# Patient Record
Sex: FEMALE | Race: BLACK OR AFRICAN AMERICAN | NOT HISPANIC OR LATINO | Employment: UNEMPLOYED | URBAN - METROPOLITAN AREA
[De-identification: names, ages, dates, MRNs, and addresses within clinical notes are randomized per-mention and may not be internally consistent; named-entity substitution may affect disease eponyms.]

---

## 2019-02-14 ENCOUNTER — HOSPITAL ENCOUNTER (EMERGENCY)
Facility: MEDICAL CENTER | Age: 49
End: 2019-02-14
Attending: EMERGENCY MEDICINE

## 2019-02-14 VITALS
BODY MASS INDEX: 28.14 KG/M2 | TEMPERATURE: 98.3 F | WEIGHT: 190 LBS | HEIGHT: 69 IN | RESPIRATION RATE: 16 BRPM | HEART RATE: 90 BPM | SYSTOLIC BLOOD PRESSURE: 153 MMHG | DIASTOLIC BLOOD PRESSURE: 99 MMHG

## 2019-02-14 DIAGNOSIS — Z91.148 H/O MEDICATION NONCOMPLIANCE: ICD-10-CM

## 2019-02-14 DIAGNOSIS — I10 ESSENTIAL HYPERTENSION: ICD-10-CM

## 2019-02-14 PROCEDURE — 99284 EMERGENCY DEPT VISIT MOD MDM: CPT

## 2019-02-15 NOTE — ED NOTES
Pt discharged to [facility], pt left in [employer] custody. Pt is ambulatory with a steady gait. All paperwork provided and given to [employer]. Pt is A&Ox4, no questions regarding discharge. Pt given the name of her BP medication that she was on prior. Pt left with all belongings.

## 2019-02-15 NOTE — ED TRIAGE NOTES
Whitneygaldino Latif  48 y.o. female  Chief Complaint   Patient presents with   • Medical Clearance   • Hypertension       Pt BIB [employer] for clearance to go to halfway, pt presents with hypertension. Pt has a hx of hypertension and reports that she has been off all of her medications due to insurance. Pt is A&Ox4, BP at the halfway was 186/122. Pt is now 166/117 upon arrival. Waiting ERP

## 2019-02-15 NOTE — ED PROVIDER NOTES
ED Provider Note    CHIEF COMPLAINT  Chief Complaint   Patient presents with   • Medical Clearance   • Hypertension       HPI  Rae Latif is a 48 y.o. female who presents to [facility] department for medical screening.  Past history of hypertension.  She has been off of her antihypertensive medication for approximately 4 months.  Tonight was picked up by [employer] for outstanding warrant.  At the booking triage she had elevated blood pressure and was therefore sent to the ER for medical screening.  The patient states that she is had no medical complaints and has been feeling herself.  No headache.  No chest pain.  No shortness of breath.  No abdominal pain.  She denies any stimulant use including caffeine, cocaine or methamphetamines.  She says that she has previously been seen by [facility] as well as a local clinic for both the management of her hypertension and her COPD.  Again no current complaints.    REVIEW OF SYSTEMS  See HPI for further details. All other systems are negative.     PAST MEDICAL HISTORY   has a past medical history of Arthritis shoulder (11/15/2011); ASTHMA; Bipolar disorder (HCC) (9/28/2011); COPD, mild (HCC) (1/26/2012); Head ache (9/28/2011); Hepatitis C (2005); Hypertension; Psychiatric disorder; and Rotator cuff tendinitis (9/28/2011).    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    CURRENT MEDICATIONS  Home Medications     Reviewed by Titi  "JANICE Thomson R.N. (Registered Nurse) on 02/14/19 at 1921  Med List Status: Partial   Medication Last Dose Status   acetaminophen 650 MG Tab  Active   amlodipine (NORVASC) 10 MG Tab  Active   ciprofloxacin (CIPRO) 500 MG Tab  Active   docusate sodium (COLACE) 100 MG Cap  Active   doxycycline (MONODOX) 100 MG capsule  Active   famotidine (PEPCID) 20 MG Tab  Active   ferrous gluconate (FERGON) 324 (38 FE) MG Tab  Active   hydrocodone-acetaminophen (NORCO) 5-325 MG Tab per tablet  Active   lorazepam (ATIVAN) 2 MG/ML Conc  Active   morphine SR (MS CONTIN) 15 MG Tab CR tablet  Active   oxycodone immediate release (ROXICODONE) 10 MG immediate release tablet  Active   senna-docusate (PERICOLACE OR SENOKOT S) 8.6-50 MG Tab  Active   sulfamethoxazole-trimethoprim (BACTRIM) 400-80 MG Tab  Active                ALLERGIES  Allergies   Allergen Reactions   • Lisinopril      DIZZINESS       PHYSICAL EXAM  VITAL SIGNS: /99   Pulse 90   Temp 36.8 °C (98.3 °F) (Temporal)   Resp 16   Ht 1.753 m (5' 9\")   Wt 86.2 kg (190 lb)   BMI 28.06 kg/m²  @BELIA[[id number]::@  Pulse ox interpretation: I interpret this pulse ox as normal.  Constitutional: Alert in no apparent distress.  HENT: Normocephalic, Atraumatic, Bilateral external ears normal. Nose normal.   Eyes: Pupils are equal and reactive. Conjunctiva normal, non-icteric.   Heart: Regular rate and rythm, no murmurs.    Lungs: Clear to auscultation bilaterally.  Abdomen: Soft, nontender, nondistended  Skin: Warm, Dry, No erythema, No rash.   Neurologic: Alert, Grossly non-focal.   Psychiatric: Affect normal, Judgment normal, Mood normal, Appears appropriate and not intoxicated.           COURSE & MEDICAL DECISION MAKING  Pertinent Labs & Imaging studies reviewed. (See chart for details)  Patient presented the emerge department for medical screening for PD.  Patient picked up for warrant and noted to be hypertensive.  Patient with known history of hypertension and medication " noncompliance.  I have given her referral back to primary care and local clinic for ongoing outpatient blood pressure monitoring and treatment.  At this point I do not feel there is any further medical need for evaluation or intervention she can be processed at the shelter.  Again she is understanding of need for ongoing care of her blood pressure.       The patient will return for worsening symptoms and is stable at the time of discharge. The patient verbalizes understanding and will comply.    FINAL IMPRESSION  1. Essential hypertension    2. H/O medication noncompliance               Electronically signed by: Myron Chow, 2/14/2019 8:15 PM

## 2019-03-27 ENCOUNTER — HOSPITAL ENCOUNTER (EMERGENCY)
Facility: MEDICAL CENTER | Age: 49
End: 2019-03-27
Attending: EMERGENCY MEDICINE

## 2019-03-27 ENCOUNTER — APPOINTMENT (OUTPATIENT)
Dept: RADIOLOGY | Facility: MEDICAL CENTER | Age: 49
End: 2019-03-27
Attending: EMERGENCY MEDICINE

## 2019-03-27 VITALS
HEIGHT: 69 IN | OXYGEN SATURATION: 98 % | WEIGHT: 190 LBS | SYSTOLIC BLOOD PRESSURE: 122 MMHG | DIASTOLIC BLOOD PRESSURE: 76 MMHG | HEART RATE: 78 BPM | BODY MASS INDEX: 28.14 KG/M2 | TEMPERATURE: 98.6 F | RESPIRATION RATE: 18 BRPM

## 2019-03-27 DIAGNOSIS — M17.10 ARTHRITIS OF KNEE: ICD-10-CM

## 2019-03-27 DIAGNOSIS — M25.561 ACUTE PAIN OF RIGHT KNEE: ICD-10-CM

## 2019-03-27 PROCEDURE — 99284 EMERGENCY DEPT VISIT MOD MDM: CPT

## 2019-03-27 PROCEDURE — A9270 NON-COVERED ITEM OR SERVICE: HCPCS | Performed by: EMERGENCY MEDICINE

## 2019-03-27 PROCEDURE — 700102 HCHG RX REV CODE 250 W/ 637 OVERRIDE(OP): Performed by: EMERGENCY MEDICINE

## 2019-03-27 PROCEDURE — 73564 X-RAY EXAM KNEE 4 OR MORE: CPT | Mod: RT

## 2019-03-27 RX ORDER — NAPROXEN 500 MG/1
500 TABLET ORAL ONCE
Status: COMPLETED | OUTPATIENT
Start: 2019-03-27 | End: 2019-03-27

## 2019-03-27 RX ADMIN — Medication 500 MG: at 18:11

## 2019-03-28 NOTE — ED TRIAGE NOTES
"Chief Complaint   Patient presents with   • Knee Pain     Blood pressure 123/78, pulse 80, temperature 36.9 °C (98.4 °F), temperature source Temporal, resp. rate 17, height 1.753 m (5' 9\"), weight 86.2 kg (190 lb), SpO2 97 %, not currently breastfeeding.    Pt is a 49yo female presenting to ED via EMS w c/o R knee pain. PT reports she fell on her R knee and has had pain since. Pt reports hx of arthroscopy. Pt able to bear weight. + distal pulses and sensation.   "

## 2019-03-28 NOTE — DISCHARGE INSTRUCTIONS
Your x-rays did not show any injury from your recent fall.  You do have severe arthritis in your right knee.  Although you may benefit from consulting orthopedics at your earliest convenience for reevaluation and since, you are medically cleared to return to law enforcement custody.  Pain from arthritis is best treated with medications like Tylenol and ibuprofen, ice packs, and use of ACE banages or neoprene knee braces for extra support.

## 2019-03-28 NOTE — ED PROVIDER NOTES
ED Provider Note    Scribed for Bijan Reveles M.D. by Bijan Reveles. 3/27/2019  5:32 PM    CHIEF COMPLAINT  Chief Complaint   Patient presents with   • Knee Pain       HPI  Kassie Delgadillo is a 48 y.o. female who presents to the Emergency Room From [facility] after falling onto her right knee.  She says that she has a history of arthroscopy to this knee, and has had problems with it chronically, and it sometimes gives out on her.  She says that she was trying to step up, and the knee felt unstable, and then she fell, landing directly on her anterior right knee.  However, she is been ambulatory since that incident a couple of hours ago.  She did not have any prodrome of weakness or dizziness.  She has not been feeling sick and denies fevers or chills or nausea or vomiting or chest pain or shortness of breath.    REVIEW OF SYSTEMS  See HPI for further details.    PAST MEDICAL HISTORY   has a past medical history of Arthritis shoulder (11/15/2011); ASTHMA; Bipolar disorder ([facility]) (9/28/2011); COPD, mild ([facility]) (1/26/2012); Head ache (9/28/2011); Hepatitis C (2005); Hypertension; Psychiatric disorder; and Rotator cuff tendinitis (9/28/2011).    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    CURRENT MEDICATIONS  Home Medications     Reviewed by Arnold Elder R.N. (Registered Nurse) on 03/27/19 at 1627  Med List Status: Partial   Medication Last Dose Status  "  acetaminophen 650 MG Tab  Active   amlodipine (NORVASC) 10 MG Tab  Active   ciprofloxacin (CIPRO) 500 MG Tab  Active   docusate sodium (COLACE) 100 MG Cap  Active   doxycycline (MONODOX) 100 MG capsule  Active   famotidine (PEPCID) 20 MG Tab  Active   ferrous gluconate (FERGON) 324 (38 FE) MG Tab  Active   hydrocodone-acetaminophen (NORCO) 5-325 MG Tab per tablet  Active   lorazepam (ATIVAN) 2 MG/ML Conc  Active   morphine SR (MS CONTIN) 15 MG Tab CR tablet  Active   oxycodone immediate release (ROXICODONE) 10 MG immediate release tablet  Active   senna-docusate (PERICOLACE OR SENOKOT S) 8.6-50 MG Tab  Active   sulfamethoxazole-trimethoprim (BACTRIM) 400-80 MG Tab  Active                ALLERGIES  Allergies   Allergen Reactions   • Lisinopril      DIZZINESS       PHYSICAL EXAM  VITAL SIGNS: /76   Pulse 78   Temp 37 °C (98.6 °F)   Resp 18   Ht 1.753 m (5' 9\")   Wt 86.2 kg (190 lb)   SpO2 98%   BMI 28.06 kg/m²   Pulse ox interpretation: I interpret this pulse ox as normal.  Constitutional: Alert in no apparent distress.  HENT: Normocephalic, Atraumatic, Bilateral external ears normal. Nose normal.   Eyes: Conjunctiva normal, non-icteric.   Heart: Normal peripheral perfusion.  Lungs: Unlabored respirations  Skin: Warm, Dry, No erythema, No rash.   Extremities: Diffuse tenderness anteriorly to the right knee without ligamentous laxity.  There is a significant joint effusion.  There is no obvious bony deformity or crepitus.  Neurologic: Alert, Grossly non-focal.   Psychiatric: Affect normal, Judgment normal, Mood normal, Appears appropriate and not intoxicated.     DX-KNEE COMPLETE 4+ RIGHT   Final Result      1.  No acute osseous abnormality.   2.  Severe tricompartmental osteoarthrosis.          COURSE & MEDICAL DECISION MAKING  The patient's VS, Nurses notes reviewed. (See chart for details)    5:32 PM Patient seen and examined at bedside. Ordered for x-ray of the right knee to evaluate. Patient will be " treated with Naprosyn and an ice pack for her symptoms.     I returned to the bedside with the patient and the accompanying officer know that her x-ray did not show any signs of acute injury, though she has severe tricompartmental arthritis.  She is stable for discharge back to law enforcement custody, but may benefit from consultation with orthopedics at her earliest convenience.  We reviewed supportive care at the bedside as well.     The patient will return for new or worsening symptoms and is stable at the time of discharge.    The patient is referred to a primary physician for blood pressure management, diabetic screening, and for all other preventative health concerns.      DISPOSITION:  Patient will be discharged home in stable condition.    FOLLOW UP:  Lamin Barkley M.D.  [address]  [address]  [facility] [id number] [phone]    Schedule an appointment as soon as possible for a visit         OUTPATIENT MEDICATIONS:  Discharge Medication List as of 3/27/2019  6:54 PM            FINAL IMPRESSION  1. Acute pain of right knee Active   2. Arthritis of knee Chronic

## 2020-09-11 ENCOUNTER — HOSPITAL ENCOUNTER (INPATIENT)
Facility: MEDICAL CENTER | Age: 50
LOS: 3 days | DRG: 603 | End: 2020-09-15
Attending: EMERGENCY MEDICINE | Admitting: INTERNAL MEDICINE
Payer: MEDICAID

## 2020-09-11 ENCOUNTER — APPOINTMENT (OUTPATIENT)
Dept: RADIOLOGY | Facility: MEDICAL CENTER | Age: 50
DRG: 603 | End: 2020-09-11
Attending: EMERGENCY MEDICINE
Payer: MEDICAID

## 2020-09-11 DIAGNOSIS — L03.116 LEFT LEG CELLULITIS: ICD-10-CM

## 2020-09-11 DIAGNOSIS — S80.212A ABRASION, LEFT KNEE, INITIAL ENCOUNTER: ICD-10-CM

## 2020-09-11 LAB
ANION GAP SERPL CALC-SCNC: 11 MMOL/L (ref 7–16)
BASOPHILS # BLD AUTO: 0.4 % (ref 0–1.8)
BASOPHILS # BLD: 0.07 K/UL (ref 0–0.12)
BUN SERPL-MCNC: 14 MG/DL (ref 8–22)
CALCIUM SERPL-MCNC: 8.2 MG/DL (ref 8.5–10.5)
CHLORIDE SERPL-SCNC: 104 MMOL/L (ref 96–112)
CO2 SERPL-SCNC: 18 MMOL/L (ref 20–33)
CREAT SERPL-MCNC: 0.85 MG/DL (ref 0.5–1.4)
EOSINOPHIL # BLD AUTO: 0.03 K/UL (ref 0–0.51)
EOSINOPHIL NFR BLD: 0.2 % (ref 0–6.9)
ERYTHROCYTE [DISTWIDTH] IN BLOOD BY AUTOMATED COUNT: 47.4 FL (ref 35.9–50)
GLUCOSE SERPL-MCNC: 101 MG/DL (ref 65–99)
HCT VFR BLD AUTO: 39.8 % (ref 37–47)
HGB BLD-MCNC: 13.6 G/DL (ref 12–16)
IMM GRANULOCYTES # BLD AUTO: 0.16 K/UL (ref 0–0.11)
IMM GRANULOCYTES NFR BLD AUTO: 0.8 % (ref 0–0.9)
LACTATE BLD-SCNC: 1.4 MMOL/L (ref 0.5–2)
LYMPHOCYTES # BLD AUTO: 1.24 K/UL (ref 1–4.8)
LYMPHOCYTES NFR BLD: 6.2 % (ref 22–41)
MCH RBC QN AUTO: 33.7 PG (ref 27–33)
MCHC RBC AUTO-ENTMCNC: 34.2 G/DL (ref 33.6–35)
MCV RBC AUTO: 98.8 FL (ref 81.4–97.8)
MONOCYTES # BLD AUTO: 1.43 K/UL (ref 0–0.85)
MONOCYTES NFR BLD AUTO: 7.2 % (ref 0–13.4)
NEUTROPHILS # BLD AUTO: 17.07 K/UL (ref 2–7.15)
NEUTROPHILS NFR BLD: 85.2 % (ref 44–72)
NRBC # BLD AUTO: 0 K/UL
NRBC BLD-RTO: 0 /100 WBC
PLATELET # BLD AUTO: 171 K/UL (ref 164–446)
PMV BLD AUTO: 9.2 FL (ref 9–12.9)
POTASSIUM SERPL-SCNC: 4 MMOL/L (ref 3.6–5.5)
RBC # BLD AUTO: 4.03 M/UL (ref 4.2–5.4)
SODIUM SERPL-SCNC: 133 MMOL/L (ref 135–145)
WBC # BLD AUTO: 20 K/UL (ref 4.8–10.8)

## 2020-09-11 PROCEDURE — 96375 TX/PRO/DX INJ NEW DRUG ADDON: CPT

## 2020-09-11 PROCEDURE — 73562 X-RAY EXAM OF KNEE 3: CPT | Mod: LT

## 2020-09-11 PROCEDURE — 87040 BLOOD CULTURE FOR BACTERIA: CPT

## 2020-09-11 PROCEDURE — 99219 PR INITIAL OBSERVATION CARE,LEVL II: CPT | Performed by: INTERNAL MEDICINE

## 2020-09-11 PROCEDURE — 99285 EMERGENCY DEPT VISIT HI MDM: CPT

## 2020-09-11 PROCEDURE — C9803 HOPD COVID-19 SPEC COLLECT: HCPCS | Performed by: INTERNAL MEDICINE

## 2020-09-11 PROCEDURE — 93971 EXTREMITY STUDY: CPT | Mod: LT

## 2020-09-11 PROCEDURE — G0378 HOSPITAL OBSERVATION PER HR: HCPCS

## 2020-09-11 PROCEDURE — 85025 COMPLETE CBC W/AUTO DIFF WBC: CPT

## 2020-09-11 PROCEDURE — 700111 HCHG RX REV CODE 636 W/ 250 OVERRIDE (IP): Performed by: EMERGENCY MEDICINE

## 2020-09-11 PROCEDURE — 80048 BASIC METABOLIC PNL TOTAL CA: CPT

## 2020-09-11 PROCEDURE — 83605 ASSAY OF LACTIC ACID: CPT

## 2020-09-11 PROCEDURE — 700105 HCHG RX REV CODE 258: Performed by: EMERGENCY MEDICINE

## 2020-09-11 PROCEDURE — 96365 THER/PROPH/DIAG IV INF INIT: CPT

## 2020-09-11 RX ORDER — KETOROLAC TROMETHAMINE 30 MG/ML
30 INJECTION, SOLUTION INTRAMUSCULAR; INTRAVENOUS ONCE
Status: COMPLETED | OUTPATIENT
Start: 2020-09-11 | End: 2020-09-11

## 2020-09-11 RX ORDER — HALOPERIDOL 5 MG/1
5 TABLET ORAL EVERY 6 HOURS PRN
Status: DISCONTINUED | OUTPATIENT
Start: 2020-09-11 | End: 2020-09-15 | Stop reason: HOSPADM

## 2020-09-11 RX ORDER — ACETAMINOPHEN 325 MG/1
650 TABLET ORAL EVERY 6 HOURS PRN
Status: DISCONTINUED | OUTPATIENT
Start: 2020-09-11 | End: 2020-09-15 | Stop reason: HOSPADM

## 2020-09-11 RX ORDER — AMOXICILLIN 250 MG
2 CAPSULE ORAL 2 TIMES DAILY
Status: DISCONTINUED | OUTPATIENT
Start: 2020-09-11 | End: 2020-09-15 | Stop reason: HOSPADM

## 2020-09-11 RX ORDER — KETOROLAC TROMETHAMINE 30 MG/ML
15 INJECTION, SOLUTION INTRAMUSCULAR; INTRAVENOUS EVERY 6 HOURS PRN
Status: DISCONTINUED | OUTPATIENT
Start: 2020-09-11 | End: 2020-09-15 | Stop reason: HOSPADM

## 2020-09-11 RX ORDER — POLYETHYLENE GLYCOL 3350 17 G/17G
1 POWDER, FOR SOLUTION ORAL
Status: DISCONTINUED | OUTPATIENT
Start: 2020-09-11 | End: 2020-09-15 | Stop reason: HOSPADM

## 2020-09-11 RX ORDER — SODIUM CHLORIDE 9 MG/ML
INJECTION, SOLUTION INTRAVENOUS CONTINUOUS
Status: DISCONTINUED | OUTPATIENT
Start: 2020-09-11 | End: 2020-09-13

## 2020-09-11 RX ORDER — AMLODIPINE BESYLATE 10 MG/1
10 TABLET ORAL DAILY
Status: DISCONTINUED | OUTPATIENT
Start: 2020-09-12 | End: 2020-09-15 | Stop reason: HOSPADM

## 2020-09-11 RX ORDER — DOCUSATE SODIUM 100 MG/1
100 CAPSULE, LIQUID FILLED ORAL 2 TIMES DAILY
Status: DISCONTINUED | OUTPATIENT
Start: 2020-09-11 | End: 2020-09-11

## 2020-09-11 RX ORDER — ARIPIPRAZOLE 2 MG/1
2 TABLET ORAL EVERY EVENING
Status: DISCONTINUED | OUTPATIENT
Start: 2020-09-11 | End: 2020-09-15 | Stop reason: HOSPADM

## 2020-09-11 RX ORDER — BISACODYL 10 MG
10 SUPPOSITORY, RECTAL RECTAL
Status: DISCONTINUED | OUTPATIENT
Start: 2020-09-11 | End: 2020-09-15 | Stop reason: HOSPADM

## 2020-09-11 RX ORDER — CLONIDINE HYDROCHLORIDE 0.1 MG/1
0.1 TABLET ORAL EVERY 6 HOURS PRN
Status: DISCONTINUED | OUTPATIENT
Start: 2020-09-11 | End: 2020-09-15 | Stop reason: HOSPADM

## 2020-09-11 RX ADMIN — CEFTRIAXONE SODIUM 1 G: 1 INJECTION, POWDER, FOR SOLUTION INTRAMUSCULAR; INTRAVENOUS at 20:09

## 2020-09-11 RX ADMIN — KETOROLAC TROMETHAMINE 30 MG: 30 INJECTION, SOLUTION INTRAMUSCULAR at 20:09

## 2020-09-11 ASSESSMENT — PAIN SCALES - WONG BAKER: WONGBAKER_NUMERICALRESPONSE: HURTS JUST A LITTLE BIT

## 2020-09-11 ASSESSMENT — PATIENT HEALTH QUESTIONNAIRE - PHQ9
1. LITTLE INTEREST OR PLEASURE IN DOING THINGS: NOT AT ALL
SUM OF ALL RESPONSES TO PHQ9 QUESTIONS 1 AND 2: 0
2. FEELING DOWN, DEPRESSED, IRRITABLE, OR HOPELESS: NOT AT ALL

## 2020-09-11 ASSESSMENT — PAIN DESCRIPTION - PAIN TYPE: TYPE: ACUTE PAIN

## 2020-09-12 ENCOUNTER — APPOINTMENT (OUTPATIENT)
Dept: RADIOLOGY | Facility: MEDICAL CENTER | Age: 50
DRG: 603 | End: 2020-09-12
Attending: HOSPITALIST
Payer: MEDICAID

## 2020-09-12 LAB
ANION GAP SERPL CALC-SCNC: 9 MMOL/L (ref 7–16)
BASOPHILS # BLD AUTO: 0.2 % (ref 0–1.8)
BASOPHILS # BLD: 0.05 K/UL (ref 0–0.12)
BUN SERPL-MCNC: 16 MG/DL (ref 8–22)
CALCIUM SERPL-MCNC: 7.9 MG/DL (ref 8.5–10.5)
CHLORIDE SERPL-SCNC: 104 MMOL/L (ref 96–112)
CO2 SERPL-SCNC: 21 MMOL/L (ref 20–33)
COVID ORDER STATUS COVID19: NORMAL
CREAT SERPL-MCNC: 0.77 MG/DL (ref 0.5–1.4)
EOSINOPHIL # BLD AUTO: 0.03 K/UL (ref 0–0.51)
EOSINOPHIL NFR BLD: 0.1 % (ref 0–6.9)
ERYTHROCYTE [DISTWIDTH] IN BLOOD BY AUTOMATED COUNT: 46.4 FL (ref 35.9–50)
GLUCOSE SERPL-MCNC: 123 MG/DL (ref 65–99)
HCT VFR BLD AUTO: 36.7 % (ref 37–47)
HGB BLD-MCNC: 12.3 G/DL (ref 12–16)
IMM GRANULOCYTES # BLD AUTO: 0.39 K/UL (ref 0–0.11)
IMM GRANULOCYTES NFR BLD AUTO: 1.7 % (ref 0–0.9)
LYMPHOCYTES # BLD AUTO: 1.7 K/UL (ref 1–4.8)
LYMPHOCYTES NFR BLD: 7.5 % (ref 22–41)
MCH RBC QN AUTO: 33 PG (ref 27–33)
MCHC RBC AUTO-ENTMCNC: 33.5 G/DL (ref 33.6–35)
MCV RBC AUTO: 98.4 FL (ref 81.4–97.8)
MONOCYTES # BLD AUTO: 1.57 K/UL (ref 0–0.85)
MONOCYTES NFR BLD AUTO: 7 % (ref 0–13.4)
NEUTROPHILS # BLD AUTO: 18.82 K/UL (ref 2–7.15)
NEUTROPHILS NFR BLD: 83.5 % (ref 44–72)
NRBC # BLD AUTO: 0 K/UL
NRBC BLD-RTO: 0 /100 WBC
PLATELET # BLD AUTO: 164 K/UL (ref 164–446)
PMV BLD AUTO: 9.3 FL (ref 9–12.9)
POTASSIUM SERPL-SCNC: 3.9 MMOL/L (ref 3.6–5.5)
RBC # BLD AUTO: 3.73 M/UL (ref 4.2–5.4)
SARS-COV-2 RNA RESP QL NAA+PROBE: NOTDETECTED
SODIUM SERPL-SCNC: 134 MMOL/L (ref 135–145)
SPECIMEN SOURCE: NORMAL
WBC # BLD AUTO: 22.6 K/UL (ref 4.8–10.8)

## 2020-09-12 PROCEDURE — 96376 TX/PRO/DX INJ SAME DRUG ADON: CPT

## 2020-09-12 PROCEDURE — A9270 NON-COVERED ITEM OR SERVICE: HCPCS | Performed by: INTERNAL MEDICINE

## 2020-09-12 PROCEDURE — 700117 HCHG RX CONTRAST REV CODE 255: Performed by: HOSPITALIST

## 2020-09-12 PROCEDURE — 99233 SBSQ HOSP IP/OBS HIGH 50: CPT | Performed by: HOSPITALIST

## 2020-09-12 PROCEDURE — U0003 INFECTIOUS AGENT DETECTION BY NUCLEIC ACID (DNA OR RNA); SEVERE ACUTE RESPIRATORY SYNDROME CORONAVIRUS 2 (SARS-COV-2) (CORONAVIRUS DISEASE [COVID-19]), AMPLIFIED PROBE TECHNIQUE, MAKING USE OF HIGH THROUGHPUT TECHNOLOGIES AS DESCRIBED BY CMS-2020-01-R: HCPCS

## 2020-09-12 PROCEDURE — 700111 HCHG RX REV CODE 636 W/ 250 OVERRIDE (IP): Performed by: INTERNAL MEDICINE

## 2020-09-12 PROCEDURE — 770006 HCHG ROOM/CARE - MED/SURG/GYN SEMI*

## 2020-09-12 PROCEDURE — 700102 HCHG RX REV CODE 250 W/ 637 OVERRIDE(OP): Performed by: INTERNAL MEDICINE

## 2020-09-12 PROCEDURE — 73701 CT LOWER EXTREMITY W/DYE: CPT | Mod: LT

## 2020-09-12 PROCEDURE — 96372 THER/PROPH/DIAG INJ SC/IM: CPT

## 2020-09-12 PROCEDURE — 85025 COMPLETE CBC W/AUTO DIFF WBC: CPT

## 2020-09-12 PROCEDURE — 36415 COLL VENOUS BLD VENIPUNCTURE: CPT

## 2020-09-12 PROCEDURE — 80048 BASIC METABOLIC PNL TOTAL CA: CPT

## 2020-09-12 PROCEDURE — 700105 HCHG RX REV CODE 258: Performed by: INTERNAL MEDICINE

## 2020-09-12 RX ADMIN — IOHEXOL 80 ML: 350 INJECTION, SOLUTION INTRAVENOUS at 16:18

## 2020-09-12 RX ADMIN — ARIPIPRAZOLE 2 MG: 2 TABLET ORAL at 17:39

## 2020-09-12 RX ADMIN — ENOXAPARIN SODIUM 40 MG: 40 INJECTION SUBCUTANEOUS at 05:49

## 2020-09-12 RX ADMIN — ACETAMINOPHEN 650 MG: 325 TABLET, FILM COATED ORAL at 13:34

## 2020-09-12 RX ADMIN — KETOROLAC TROMETHAMINE 15 MG: 30 INJECTION, SOLUTION INTRAMUSCULAR at 17:39

## 2020-09-12 RX ADMIN — CEFTRIAXONE SODIUM 1 G: 1 INJECTION, POWDER, FOR SOLUTION INTRAMUSCULAR; INTRAVENOUS at 17:39

## 2020-09-12 RX ADMIN — SODIUM CHLORIDE: 9 INJECTION, SOLUTION INTRAVENOUS at 05:48

## 2020-09-12 RX ADMIN — AMLODIPINE BESYLATE 10 MG: 10 TABLET ORAL at 05:48

## 2020-09-12 RX ADMIN — SODIUM CHLORIDE: 9 INJECTION, SOLUTION INTRAVENOUS at 00:23

## 2020-09-12 RX ADMIN — SODIUM CHLORIDE: 9 INJECTION, SOLUTION INTRAVENOUS at 17:39

## 2020-09-12 RX ADMIN — KETOROLAC TROMETHAMINE 15 MG: 30 INJECTION, SOLUTION INTRAMUSCULAR at 05:49

## 2020-09-12 RX ADMIN — SODIUM CHLORIDE: 9 INJECTION, SOLUTION INTRAVENOUS at 23:22

## 2020-09-12 RX ADMIN — ACETAMINOPHEN 650 MG: 325 TABLET, FILM COATED ORAL at 21:23

## 2020-09-12 ASSESSMENT — LIFESTYLE VARIABLES
TOTAL SCORE: 0
EVER FELT BAD OR GUILTY ABOUT YOUR DRINKING: NO
TOTAL SCORE: 0
HAVE YOU EVER FELT YOU SHOULD CUT DOWN ON YOUR DRINKING: NO
HAVE PEOPLE ANNOYED YOU BY CRITICIZING YOUR DRINKING: NO
ON A TYPICAL DAY WHEN YOU DRINK ALCOHOL HOW MANY DRINKS DO YOU HAVE: 0
AVERAGE NUMBER OF DAYS PER WEEK YOU HAVE A DRINK CONTAINING ALCOHOL: 0
EVER HAD A DRINK FIRST THING IN THE MORNING TO STEADY YOUR NERVES TO GET RID OF A HANGOVER: NO
CONSUMPTION TOTAL: NEGATIVE
ALCOHOL_USE: NO
TOTAL SCORE: 0
DOES PATIENT WANT TO STOP DRINKING: NO
HOW MANY TIMES IN THE PAST YEAR HAVE YOU HAD 5 OR MORE DRINKS IN A DAY: 0

## 2020-09-12 ASSESSMENT — PAIN DESCRIPTION - PAIN TYPE
TYPE: ACUTE PAIN

## 2020-09-12 ASSESSMENT — ENCOUNTER SYMPTOMS
HEMOPTYSIS: 0
NECK PAIN: 0
WEAKNESS: 0
SORE THROAT: 0
HEADACHES: 0
COUGH: 0
EYES NEGATIVE: 1
VOMITING: 0
CHILLS: 0
TINGLING: 0
BLURRED VISION: 0
DOUBLE VISION: 0
PALPITATIONS: 0
DEPRESSION: 0
NAUSEA: 0
DIARRHEA: 0
FOCAL WEAKNESS: 0
INSOMNIA: 0
MYALGIAS: 1
FEVER: 0
TREMORS: 0
WEIGHT LOSS: 0
BRUISES/BLEEDS EASILY: 0
DIZZINESS: 0
SENSORY CHANGE: 0
MYALGIAS: 0
STRIDOR: 0
ABDOMINAL PAIN: 0
HEARTBURN: 0

## 2020-09-12 ASSESSMENT — PATIENT HEALTH QUESTIONNAIRE - PHQ9
SUM OF ALL RESPONSES TO PHQ9 QUESTIONS 1 AND 2: 0
1. LITTLE INTEREST OR PLEASURE IN DOING THINGS: NOT AT ALL
2. FEELING DOWN, DEPRESSED, IRRITABLE, OR HOPELESS: NOT AT ALL

## 2020-09-12 ASSESSMENT — COGNITIVE AND FUNCTIONAL STATUS - GENERAL
MOBILITY SCORE: 18
MOVING TO AND FROM BED TO CHAIR: A LITTLE
DRESSING REGULAR LOWER BODY CLOTHING: A LITTLE
TOILETING: A LITTLE
DRESSING REGULAR UPPER BODY CLOTHING: A LOT
DAILY ACTIVITIY SCORE: 18
SUGGESTED CMS G CODE MODIFIER DAILY ACTIVITY: CK
STANDING UP FROM CHAIR USING ARMS: A LITTLE
MOVING FROM LYING ON BACK TO SITTING ON SIDE OF FLAT BED: A LITTLE
SUGGESTED CMS G CODE MODIFIER MOBILITY: CK
HELP NEEDED FOR BATHING: A LOT
TURNING FROM BACK TO SIDE WHILE IN FLAT BAD: A LITTLE
CLIMB 3 TO 5 STEPS WITH RAILING: A LITTLE
WALKING IN HOSPITAL ROOM: A LITTLE

## 2020-09-12 ASSESSMENT — PAIN SCALES - WONG BAKER: WONGBAKER_NUMERICALRESPONSE: DOESN'T HURT AT ALL

## 2020-09-12 NOTE — ED TRIAGE NOTES
BIB [facility], pt walked in with a steady gait.  Ground level fall 3 days ago after tripping.  Abrasions to the L knee, red, swollen and painful.  CMS intact, denies LOC or blood thinners

## 2020-09-12 NOTE — H&P
Hospital Medicine History & Physical Note    Date of Service  9/11/2020    Primary Care Physician  No primary care provider on file.    Consultants      Code Status  Full Code    Chief Complaint  Chief Complaint   Patient presents with   • Knee Pain       History of Presenting Illness  49 y.o. female who presented 9/11/2020 with history of homelessness with COPD,  tobacco and marijuana use.  She presents 4 days after falling to left her knee resulting in abrasion which has become increasingly painful, erythemic but without joint pain.  She admits subjective fever and chills prompting evaluation emergency department where she is found to have left leg cellulitis and leukocytosis.  She started on IV Rocephin and referred to the hospitalist for admission.  She denies history of MRSA, recent use of antibiotics or any routine daily medications.    Review of Systems  Review of Systems   Constitutional: Negative for fever, malaise/fatigue and weight loss.   HENT: Negative for sore throat and tinnitus.    Eyes: Negative for blurred vision and double vision.   Respiratory: Negative for cough, hemoptysis and stridor.    Cardiovascular: Negative for chest pain and palpitations.   Gastrointestinal: Negative for nausea and vomiting.   Genitourinary: Negative for dysuria and urgency.   Musculoskeletal: Negative for myalgias and neck pain.   Skin: Negative for itching and rash.   Neurological: Negative for dizziness and headaches.   Endo/Heme/Allergies: Does not bruise/bleed easily.   Psychiatric/Behavioral: Negative for depression. The patient does not have insomnia.    All other systems reviewed and are negative.      Past Medical History   has a past medical history of Arthritis shoulder (11/15/2011), ASTHMA, Bipolar disorder (HCC) (9/28/2011), COPD, mild (HCC) (1/26/2012), Head ache (9/28/2011), Hepatitis C (2005), Hypertension, Psychiatric disorder, and Rotator cuff tendinitis (9/28/2011).    Surgical History   has a past  surgical history that includes other orthopedic surgery (2000); tubal coagulation laparoscopic bilateral; and irrigation & debridement ortho (Right, 10/22/2015).     Family History  family history includes Cancer in her maternal grandmother and mother; Diabetes in her maternal grandmother and mother; Heart Disease in her maternal grandmother; Psychiatric Illness in her mother.     Social History   reports that she has been smoking cigarettes. She has a 5.00 pack-year smoking history. She has never used smokeless tobacco. She reports that she does not drink alcohol or use drugs.    Allergies  Allergies   Allergen Reactions   • Lisinopril      DIZZINESS       Medications  Prior to Admission Medications   Prescriptions Last Dose Informant Patient Reported? Taking?   acetaminophen 650 MG Tab   No No   Sig: Take 650 mg by mouth every 6 hours as needed (Mild Pain; (Pain scale 1-3); Temp greater than 100.5 F).   amlodipine (NORVASC) 10 MG Tab   Yes No   Sig: Take 10 mg by mouth every day.   ciprofloxacin (CIPRO) 500 MG Tab   Yes No   Sig: Take 500 mg by mouth 2 times a day.   docusate sodium (COLACE) 100 MG Cap   Yes No   Sig: Take 100 mg by mouth 2 times a day.   doxycycline (MONODOX) 100 MG capsule   No No   Sig: Take 1 Cap by mouth 2 times a day.   famotidine (PEPCID) 20 MG Tab   No No   Sig: Take 1 Tab by mouth 2 Times a Day.   ferrous gluconate (FERGON) 324 (38 FE) MG Tab   No No   Sig: Take 1 Tab by mouth every morning with breakfast.   hydrocodone-acetaminophen (NORCO) 5-325 MG Tab per tablet   No No   Sig: Take 1-2 Tabs by mouth every 6 hours as needed (for pain).   lorazepam (ATIVAN) 2 MG/ML Conc   Yes No   Sig: Take 0.5 mg by mouth every 8 hours as needed.   morphine SR (MS CONTIN) 15 MG Tab CR tablet   No No   Sig: Take 1 Tab by mouth every 12 hours.   oxycodone immediate release (ROXICODONE) 10 MG immediate release tablet   No No   Sig: Take 1 Tab by mouth every four hours as needed (Moderate Pain; (Pain scale  4-6)).   senna-docusate (PERICOLACE OR SENOKOT S) 8.6-50 MG Tab   No No   Sig: Take 1 Tab by mouth every 24 hours as needed for Constipation.   sulfamethoxazole-trimethoprim (BACTRIM) 400-80 MG Tab   Yes No   Sig: Take 1 Tab by mouth 2 times a day.      Facility-Administered Medications: None       Physical Exam  Temp:  [36.7 °C (98 °F)] 36.7 °C (98 °F)  Pulse:  [106-109] 109  BP: (101-117)/(58-65) 117/65  SpO2:  [96 %-98 %] 98 %    Physical Exam  Vitals signs and nursing note reviewed.   Constitutional:       General: She is not in acute distress.     Appearance: Normal appearance. She is normal weight. She is not toxic-appearing.   HENT:      Head: Normocephalic and atraumatic.      Nose: Nose normal. No congestion or rhinorrhea.      Mouth/Throat:      Mouth: Mucous membranes are moist.      Pharynx: Oropharynx is clear.   Eyes:      Extraocular Movements: Extraocular movements intact.      Conjunctiva/sclera: Conjunctivae normal.      Pupils: Pupils are equal, round, and reactive to light.   Neck:      Musculoskeletal: Normal range of motion and neck supple. No muscular tenderness.      Vascular: No carotid bruit.   Cardiovascular:      Rate and Rhythm: Normal rate and regular rhythm.      Pulses: Normal pulses.      Heart sounds: Normal heart sounds. No murmur. No gallop.    Pulmonary:      Effort: No respiratory distress.      Breath sounds: Normal breath sounds. No wheezing or rales.   Abdominal:      General: Abdomen is flat. Bowel sounds are normal. There is no distension.      Palpations: Abdomen is soft. There is no mass.      Tenderness: There is no abdominal tenderness.      Hernia: No hernia is present.   Musculoskeletal:         General: No tenderness or signs of injury.   Lymphadenopathy:      Cervical: No cervical adenopathy.   Skin:     General: Skin is warm.      Capillary Refill: Capillary refill takes less than 2 seconds.      Coloration: Skin is not jaundiced or pale.      Findings: Bruising,  erythema and lesion present.      Comments: Circumferential erythema about the left knee, extending distally to the mid lower leg and superiorly to the distal third of the thigh.  No crepitus, fluctuation, several excoriations without discharge   Neurological:      General: No focal deficit present.      Mental Status: She is alert and oriented to person, place, and time. Mental status is at baseline.      Cranial Nerves: No cranial nerve deficit.      Motor: No weakness.      Coordination: Coordination normal.      Gait: Gait normal.   Psychiatric:         Mood and Affect: Mood normal.         Behavior: Behavior normal.         Thought Content: Thought content normal.         Judgment: Judgment normal.      Comments: Poor eye contact, odd affect         Laboratory:  Recent Labs     09/11/20 2009   WBC 20.0*   RBC 4.03*   HEMOGLOBIN 13.6   HEMATOCRIT 39.8   MCV 98.8*   MCH 33.7*   MCHC 34.2   RDW 47.4   PLATELETCT 171   MPV 9.2     Recent Labs     09/11/20  2131   SODIUM 133*   POTASSIUM 4.0   CHLORIDE 104   CO2 18*   GLUCOSE 101*   BUN 14   CREATININE 0.85   CALCIUM 8.2*     Recent Labs     09/11/20  2131   GLUCOSE 101*         No results for input(s): NTPROBNP in the last 72 hours.      No results for input(s): TROPONINT in the last 72 hours.    Imaging:  US-EXTREMITY VENOUS LOWER UNILAT LEFT         DX-KNEE 3 VIEWS LEFT   Final Result         1.  No acute traumatic bony injury.   2.  Bony projections from the fibular neck and lateral femoral condyle, could represent osteochondromas.            Assessment/Plan:  I anticipate this patient will require at least two midnights for appropriate medical management, necessitating inpatient admission.    Left leg cellulitis  Assessment & Plan  Superficial erythema without evidence of fluctuation or abscess likely representing strep.  IV Rocephin initiated, symptomatic management.  Follow-up physical exam anticipating retraction from erythemic borders, CBC and blood  culture.    Bipolar disorder (HCC)- (present on admission)  Assessment & Plan  Trial Abilify with PRN Haldol

## 2020-09-12 NOTE — ASSESSMENT & PLAN NOTE
On rocephin  On vancomycin  dced iv fluid  Wbc has trended down  Decrease in redness on the left knee is noted  Ct of the knee showed:1.  No evidence of acute fracture or bony destructive change.     2.  Edema/induration of the subcutaneous fat anterior to the knee and extending proximally and distally. This is is most prominent seen overlying the patella. This likely represents cellulitis. Some bursal fluid within the prepatellar bursa could be   present as well.     3.  Mild tricompartment degenerative change of the knee.     4.  Benign-appearing osteochondroma emanating from the proximal fibula posteriorly.     5.  No evidence of soft tissue abscess.     6.  Globular calcification lateral to the joint space possibly representing calcium hydroxyapatite deposition versus small intra-articular loose bodies.  .

## 2020-09-12 NOTE — PROGRESS NOTES
2 RN skin check complete.   L knee laceration reddened with dark center WAN no drainage noted. Swelling around the site.   Dryness noted on heels.   All other skin intact.

## 2020-09-12 NOTE — PROGRESS NOTES
Report received on Pt.   Pt transfered to [id number] @ 8259.  Plan of care and pertinent background information received by report. Reviewed plan of care (equipment, incentive spirometer, sequential compression devices, medications, activity, diet, fall precautions, skin care, and pain) with patient and family. Welcome packet given and reviewed with pt , all questions answered. Education provided on oral hygiene program.     Bedside report received.  Assessment complete.  A&O x 4. Patient calls appropriately.  Patient pivots to commode.   Patient has 10/10 pain. Pain managed with prescribed medications.  Denies N&V. Tolerating cardiac diet.  L knee lac reddened and swollen.  + void, + flatus, - BM.  Patient denies SOB.  SCD's off.  Patient transported to CT, resting comfortably.  Review plan with of care with patient. Call light and personal belongings with in reach. Hourly rounding in place. All needs met at this time.

## 2020-09-12 NOTE — PROGRESS NOTES
Received from yellow  pod, aox4, unsteady on her feet, uses a cane. With pain on ambulation. Call light within reach. Needs attended. Plan of care discussed and understood.

## 2020-09-12 NOTE — CARE PLAN
Problem: Safety  Goal: Will remain free from injury  Outcome: PROGRESSING AS EXPECTED  Intervention: Provide assistance with mobility  Note: Assist with ADL.     Problem: Pain Management  Goal: Pain level will decrease to patient's comfort goal  Outcome: PROGRESSING AS EXPECTED  Intervention: Follow pain managment plan developed in collaboration with patient and Interdisciplinary Team  Note: Administer prescribed pain meds.     Problem: Infection  Goal: Will remain free from infection  Outcome: PROGRESSING AS EXPECTED  Intervention: Implement standard precautions and perform hand washing before and after patient contact  Note: Practice aseptic technique.

## 2020-09-12 NOTE — ED PROVIDER NOTES
"ED Provider Note    CHIEF COMPLAINT  Chief Complaint   Patient presents with   • Knee Pain       HPI  Rae Latif is a 49 y.o. homeless female who presents complaining of left knee pain.    Patient states [place], striking her left knee.  The achey, nonradiating pain has gradually increased and she now noticed redness.  Patient denies fever, chills, chest pain, shortness of breath.  Patient believes she is up-to-date on tetanus given recent incarceration.      ALLERGIES  Allergies   Allergen Reactions   • Lisinopril      DIZZINESS       CURRENT MEDICATIONS  Norvasc, Pepcid    PAST MEDICAL HISTORY   has a past medical history of Arthritis shoulder (11/15/2011), ASTHMA, Bipolar disorder (HCC) (9/28/2011), COPD, mild (HCC) (1/26/2012), Head ache (9/28/2011), Hepatitis C (2005), Hypertension, Psychiatric disorder, and Rotator cuff tendinitis (9/28/2011).    SURGICAL HISTORY   has a past surgical history that includes other orthopedic surgery (2000); tubal coagulation laparoscopic bilateral; and irrigation & debridement ortho (Right, 10/22/2015).    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  All other systems are negative except as above in HPI.      PHYSICAL EXAM  VITAL SIGNS: /58   Pulse (!) 106   Temp 36.7 °C (98 °F)   Ht 1.702 m (5' 7\")   Wt 72.6 kg (160 lb)   SpO2 96%   BMI 25.06 kg/m²     General:  WDWN, nontoxic appearing in " NAD; A+Ox3; V/S as above; tachycardic at triage, afebrile  Skin: warm and dry; good color; no rash  HEENT: NCAT; EOMs intact; PERRL; no scleral icterus   Neck: FROM; soft  Cardiovascular: Regular heart rate and rhythm.  No murmurs, rubs, or gallops; pulses 2+ bilaterally radially and DP areas  Lungs: Clear to auscultation with good air movement bilaterally.  No wheezes, rhonchi, or rales.   Abdomen: BS present; soft; NTND; no rebound, guarding, or rigidity.  No organomegaly or pulsatile mass  Extremities: DIEZ x 4; deep abrasions over the left patellar region with surrounding erythema; no streaking or fluctuance; no obvious foreign body noted; pain is not out of proportion to exam; patient is able to bend at the knee to 30 degrees; no gross effusion; calf and quadriceps are soft, not tense; 1+ left pedal edema; neg Adelaida's  Neurologic: CNs III-XII grossly intact; speech clear; distal sensation intact; strength 5/5 UE/LEs; gait steady per RN note  Psychiatric: Appropriate affect, normal mood    LABS  Results for orders placed or performed during the hospital encounter of 09/11/20   CBC WITH DIFFERENTIAL   Result Value Ref Range    WBC 20.0 (H) 4.8 - 10.8 K/uL    RBC 4.03 (L) 4.20 - 5.40 M/uL    Hemoglobin 13.6 12.0 - 16.0 g/dL    Hematocrit 39.8 37.0 - 47.0 %    MCV 98.8 (H) 81.4 - 97.8 fL    MCH 33.7 (H) 27.0 - 33.0 pg    MCHC 34.2 33.6 - 35.0 g/dL    RDW 47.4 35.9 - 50.0 fL    Platelet Count 171 164 - 446 K/uL    MPV 9.2 9.0 - 12.9 fL    Neutrophils-Polys 85.20 (H) 44.00 - 72.00 %    Lymphocytes 6.20 (L) 22.00 - 41.00 %    Monocytes 7.20 0.00 - 13.40 %    Eosinophils 0.20 0.00 - 6.90 %    Basophils 0.40 0.00 - 1.80 %    Immature Granulocytes 0.80 0.00 - 0.90 %    Nucleated RBC 0.00 /100 WBC    Neutrophils (Absolute) 17.07 (H) 2.00 - 7.15 K/uL    Lymphs (Absolute) 1.24 1.00 - 4.80 K/uL    Monos (Absolute) 1.43 (H) 0.00 - 0.85 K/uL    Eos (Absolute) 0.03 0.00 - 0.51 K/uL    Baso (Absolute) 0.07 0.00 - 0.12 K/uL     Immature Granulocytes (abs) 0.16 (H) 0.00 - 0.11 K/uL    NRBC (Absolute) 0.00 K/uL   Basic Metabolic Panel   Result Value Ref Range    Sodium 133 (L) 135 - 145 mmol/L    Potassium 4.0 3.6 - 5.5 mmol/L    Chloride 104 96 - 112 mmol/L    Co2 18 (L) 20 - 33 mmol/L    Glucose 101 (H) 65 - 99 mg/dL    Bun 14 8 - 22 mg/dL    Creatinine 0.85 0.50 - 1.40 mg/dL    Calcium 8.2 (L) 8.5 - 10.5 mg/dL    Anion Gap 11.0 7.0 - 16.0   LACTIC ACID   Result Value Ref Range    Lactic Acid 1.4 0.5 - 2.0 mmol/L   ESTIMATED GFR   Result Value Ref Range    GFR If African American >60 >60 mL/min/1.73 m 2    GFR If Non African American >60 >60 mL/min/1.73 m 2           IMAGING  US-EXTREMITY VENOUS LOWER UNILAT LEFT         DX-KNEE 3 VIEWS LEFT   Final Result         1.  No acute traumatic bony injury.   2.  Bony projections from the fibular neck and lateral femoral condyle, could represent osteochondromas.            MEDICAL RECORD  I have reviewed patient's medical record and pertinent results are listed below.      COURSE & MEDICAL DECISION MAKING  I have reviewed any medical record information, laboratory studies and radiographic results as noted.    Rae Latif is a 49 y.o. female who presents complaining of left knee pain following a fall onto the left knee sustaining an abrasion which now appears to have cellulitis.  I do not suspect disruption of the joint capsule or a septic joint.  X-ray of the knee demonstrated no foreign body and no fracture.  We obtained an ultrasound to evaluate for DVT which was negative.  Patient's blood work demonstrates a leukocytosis but no lactic acidosis, sodium 133, CO2 18, glucose 101.    Appropriate PPE was worn at all times while interacting with the patient, including goggles, N95 mask, and surgical mask.    10:42 PM  I discussed the case with Dr. Yuan from the hospitalist service who agrees to hospitalize the patient.        FINAL IMPRESSION  1. Left leg cellulitis     2. Abrasion, left knee,  initial encounter       Electronically signed by: Loraine Dodson M.D., 9/11/2020 7:59 PM

## 2020-09-13 LAB
ANION GAP SERPL CALC-SCNC: 9 MMOL/L (ref 7–16)
BUN SERPL-MCNC: 8 MG/DL (ref 8–22)
CALCIUM SERPL-MCNC: 8 MG/DL (ref 8.5–10.5)
CHLORIDE SERPL-SCNC: 98 MMOL/L (ref 96–112)
CO2 SERPL-SCNC: 20 MMOL/L (ref 20–33)
CREAT SERPL-MCNC: 0.62 MG/DL (ref 0.5–1.4)
ERYTHROCYTE [DISTWIDTH] IN BLOOD BY AUTOMATED COUNT: 47.7 FL (ref 35.9–50)
GLUCOSE SERPL-MCNC: 83 MG/DL (ref 65–99)
HCT VFR BLD AUTO: 39.9 % (ref 37–47)
HGB BLD-MCNC: 13.1 G/DL (ref 12–16)
MCH RBC QN AUTO: 32.7 PG (ref 27–33)
MCHC RBC AUTO-ENTMCNC: 32.8 G/DL (ref 33.6–35)
MCV RBC AUTO: 99.5 FL (ref 81.4–97.8)
PLATELET # BLD AUTO: 160 K/UL (ref 164–446)
PMV BLD AUTO: 9.4 FL (ref 9–12.9)
POTASSIUM SERPL-SCNC: 3.7 MMOL/L (ref 3.6–5.5)
RBC # BLD AUTO: 4.01 M/UL (ref 4.2–5.4)
SODIUM SERPL-SCNC: 127 MMOL/L (ref 135–145)
WBC # BLD AUTO: 15.1 K/UL (ref 4.8–10.8)

## 2020-09-13 PROCEDURE — 85027 COMPLETE CBC AUTOMATED: CPT

## 2020-09-13 PROCEDURE — 770006 HCHG ROOM/CARE - MED/SURG/GYN SEMI*

## 2020-09-13 PROCEDURE — A9270 NON-COVERED ITEM OR SERVICE: HCPCS | Performed by: INTERNAL MEDICINE

## 2020-09-13 PROCEDURE — 700102 HCHG RX REV CODE 250 W/ 637 OVERRIDE(OP): Performed by: FAMILY MEDICINE

## 2020-09-13 PROCEDURE — A9270 NON-COVERED ITEM OR SERVICE: HCPCS | Performed by: FAMILY MEDICINE

## 2020-09-13 PROCEDURE — 80048 BASIC METABOLIC PNL TOTAL CA: CPT

## 2020-09-13 PROCEDURE — 700111 HCHG RX REV CODE 636 W/ 250 OVERRIDE (IP): Performed by: INTERNAL MEDICINE

## 2020-09-13 PROCEDURE — 700105 HCHG RX REV CODE 258: Performed by: HOSPITALIST

## 2020-09-13 PROCEDURE — 99232 SBSQ HOSP IP/OBS MODERATE 35: CPT | Performed by: FAMILY MEDICINE

## 2020-09-13 PROCEDURE — 36415 COLL VENOUS BLD VENIPUNCTURE: CPT

## 2020-09-13 PROCEDURE — 700111 HCHG RX REV CODE 636 W/ 250 OVERRIDE (IP): Performed by: HOSPITALIST

## 2020-09-13 PROCEDURE — 700102 HCHG RX REV CODE 250 W/ 637 OVERRIDE(OP): Performed by: INTERNAL MEDICINE

## 2020-09-13 RX ORDER — TRAMADOL HYDROCHLORIDE 50 MG/1
50 TABLET ORAL EVERY 6 HOURS PRN
Status: DISCONTINUED | OUTPATIENT
Start: 2020-09-13 | End: 2020-09-15 | Stop reason: HOSPADM

## 2020-09-13 RX ADMIN — DOCUSATE SODIUM 50 MG AND SENNOSIDES 8.6 MG 2 TABLET: 8.6; 5 TABLET, FILM COATED ORAL at 06:03

## 2020-09-13 RX ADMIN — TRAMADOL HYDROCHLORIDE 50 MG: 50 TABLET, FILM COATED ORAL at 17:16

## 2020-09-13 RX ADMIN — VANCOMYCIN HYDROCHLORIDE 2750 MG: 500 INJECTION, POWDER, LYOPHILIZED, FOR SOLUTION INTRAVENOUS at 01:23

## 2020-09-13 RX ADMIN — CEFTRIAXONE SODIUM 2 G: 2 INJECTION, POWDER, FOR SOLUTION INTRAMUSCULAR; INTRAVENOUS at 06:03

## 2020-09-13 RX ADMIN — TRAMADOL HYDROCHLORIDE 50 MG: 50 TABLET, FILM COATED ORAL at 10:10

## 2020-09-13 RX ADMIN — ACETAMINOPHEN 650 MG: 325 TABLET, FILM COATED ORAL at 10:10

## 2020-09-13 RX ADMIN — VANCOMYCIN HYDROCHLORIDE 1750 MG: 500 INJECTION, POWDER, LYOPHILIZED, FOR SOLUTION INTRAVENOUS at 15:43

## 2020-09-13 RX ADMIN — ACETAMINOPHEN 650 MG: 325 TABLET, FILM COATED ORAL at 17:16

## 2020-09-13 RX ADMIN — DOCUSATE SODIUM 50 MG AND SENNOSIDES 8.6 MG 2 TABLET: 8.6; 5 TABLET, FILM COATED ORAL at 17:16

## 2020-09-13 RX ADMIN — ENOXAPARIN SODIUM 40 MG: 40 INJECTION SUBCUTANEOUS at 06:02

## 2020-09-13 RX ADMIN — KETOROLAC TROMETHAMINE 15 MG: 30 INJECTION, SOLUTION INTRAMUSCULAR at 02:37

## 2020-09-13 RX ADMIN — ARIPIPRAZOLE 2 MG: 2 TABLET ORAL at 17:16

## 2020-09-13 RX ADMIN — KETOROLAC TROMETHAMINE 15 MG: 30 INJECTION, SOLUTION INTRAMUSCULAR at 10:10

## 2020-09-13 ASSESSMENT — ENCOUNTER SYMPTOMS
DIZZINESS: 0
NAUSEA: 0
HEMOPTYSIS: 0
CHILLS: 0
BLURRED VISION: 0
SPUTUM PRODUCTION: 0
PALPITATIONS: 0
FEVER: 0
COUGH: 0
PHOTOPHOBIA: 0
DOUBLE VISION: 0
HEADACHES: 0
HEARTBURN: 0
TINGLING: 0
ORTHOPNEA: 0
VOMITING: 0

## 2020-09-13 ASSESSMENT — PAIN DESCRIPTION - PAIN TYPE
TYPE: ACUTE PAIN

## 2020-09-13 NOTE — PROGRESS NOTES
Pharmacy Kinetics Addendum:    49 y.o. female on vancomycin day # 1 9/13/2020    Currently on Vancomycin 1750 mg iv q12hr (03, 15)    Indication for Treatment: SSTI    Recent Labs     09/11/20  2131 09/12/20  0300 09/13/20  0737   BUN 14 16 8   CREATININE 0.85 0.77 0.62     No results for input(s): VANCOTROUGH, VANCOPEAK, VANCORANDOM in the last 72 hours.    A/P   1. Vancomycin dose change: none  2. Next vancomycin level: 9/14 at 1400; prior to 4th dose; ordered  3. Goal trough: 10-15 mcg/mL  4. Comments: WBC improved and afebrile since admit.  SCr stable. CT knee negative for abscess, septic joint.  If continues improvement overnight would recommend de-escalation to Ceftriaxone or PO antibiotics, as empiric vancomycin may no longer be necessary.    Home Emanuel, PharmD

## 2020-09-13 NOTE — PROGRESS NOTES
Bedside report received. Assessment completed.  Pt is A&O x4. Pt on room air.   Medicating for pain PRN per MAR Pain 2/10  Denies nausea.   - numbness, - tingling.  L knee lac WAN   Last BM PTA 9/11/20 . +flatus,   +void.  Cardiac diet. Tolerates well.   Pt up to bedside commode, stand pivot x1 asst.  Call light and belongings within reach. All needs met at this time. Fall Precautions and hourly rounding in place.

## 2020-09-13 NOTE — CARE PLAN
Problem: Safety  Goal: Will remain free from injury  Outcome: PROGRESSING AS EXPECTED   Fall precautions in place  Problem: Infection  Goal: Will remain free from infection  Outcome: PROGRESSING AS EXPECTED   Change linens, purewick to help keep patient dry  Problem: Communication  Goal: The ability to communicate needs accurately and effectively will improve  Outcome: PROGRESSING AS EXPECTED   Participation in POC  Problem: Pain Management  Goal: Pain level will decrease to patient's comfort goal  Outcome: PROGRESSING SLOWER THAN EXPECTED   Pain is poorly controlled, elevation of MD ERICK aware

## 2020-09-13 NOTE — PROGRESS NOTES
Assumed care at 0700    Received report from NOC shift RN.    Reviewed recent lab results, notes, orders, and MAR  POC discussed and updated on care board  Bed is in the lowest and locked position, call light within reach      A&Ox4  RA  LLE is red and swollen and hot  Pain is poorly controlled   MD aware   Left leg and headache   Elevation to LLE  Patient refusing to place weight on Left leg  +voiding   Commode used this AM   Patient is incontinent   Purewick in place to help keep patient's skin dry  -bm +flatus  Tolerating diet

## 2020-09-13 NOTE — PROGRESS NOTES
[facility] Medicine Daily Progress Note    Date of Service  9/13/2020    Chief Complaint  49 y.o. female admitted 9/11/2020 with falling to left her knee resulting in abrasion which has become increasingly painful, erythemic but without joint pain.    Hospital Course    49 y.o. female who presented 9/11/2020 with history of homelessness with COPD,  tobacco and marijuana use.  She presents 4 days after falling to left her knee resulting in abrasion which has become increasingly painful, erythemic but without joint pain.  She admits subjective fever and chills prompting evaluation emergency department where she is found to have left leg cellulitis and leukocytosis.  She started on IV Rocephin and referred to the hospitalist for admission.  She denies history of MRSA, recent use of antibiotics or any routine daily medications.    Interval Problem Update  Wbc has trended down    Consultants/Specialty  none    Code Status  Full Code    Disposition  pending    Review of Systems  Review of Systems   Constitutional: Positive for malaise/fatigue. Negative for chills and fever.   HENT: Negative for ear discharge, ear pain and tinnitus.    Eyes: Negative for blurred vision, double vision and photophobia.   Respiratory: Negative for cough, hemoptysis and sputum production.    Cardiovascular: Negative for chest pain, palpitations and orthopnea.   Gastrointestinal: Negative for heartburn, nausea and vomiting.   Genitourinary: Negative for dysuria, frequency and urgency.   Musculoskeletal: Positive for joint pain (knee).   Skin: Positive for rash (on the knee).   Neurological: Negative for dizziness, tingling and headaches.        Physical Exam  Temp:  [36.8 °C (98.3 °F)-38.2 °C (100.8 °F)] 37.6 °C (99.7 °F)  Pulse:  [] 110  Resp:  [19-20] 19  BP: (104-140)/(70-81) 128/75  SpO2:  [95 %-100 %] 96 %    Physical Exam  Constitutional:       Appearance: She is obese.   HENT:      Head: Normocephalic and atraumatic.      Nose: Nose  normal.      Mouth/Throat:      Mouth: Mucous membranes are dry.   Eyes:      Extraocular Movements: Extraocular movements intact.      Pupils: Pupils are equal, round, and reactive to light.   Neck:      Musculoskeletal: Normal range of motion and neck supple.   Cardiovascular:      Rate and Rhythm: Normal rate and regular rhythm.      Pulses: Normal pulses.      Heart sounds: Normal heart sounds.   Pulmonary:      Effort: No respiratory distress.      Breath sounds: No stridor.   Abdominal:      Palpations: Abdomen is soft.      Comments: obese   Skin:     General: Skin is warm.      Findings: Erythema (on the knee) present.   Neurological:      General: No focal deficit present.      Mental Status: She is alert. Mental status is at baseline.         Fluids    Intake/Output Summary (Last 24 hours) at 9/13/2020 0944  Last data filed at 9/13/2020 0929  Gross per 24 hour   Intake 772 ml   Output 1050 ml   Net -278 ml       Laboratory  Recent Labs     09/11/20 2009 09/12/20  0300 09/13/20  0737   WBC 20.0* 22.6* 15.1*   RBC 4.03* 3.73* 4.01*   HEMOGLOBIN 13.6 12.3 13.1   HEMATOCRIT 39.8 36.7* 39.9   MCV 98.8* 98.4* 99.5*   MCH 33.7* 33.0 32.7   MCHC 34.2 33.5* 32.8*   RDW 47.4 46.4 47.7   PLATELETCT 171 164 160*   MPV 9.2 9.3 9.4     Recent Labs     09/11/20 2131 09/12/20  0300 09/13/20  0737   SODIUM 133* 134* 127*   POTASSIUM 4.0 3.9 3.7   CHLORIDE 104 104 98   CO2 18* 21 20   GLUCOSE 101* 123* 83   BUN 14 16 8   CREATININE 0.85 0.77 0.62   CALCIUM 8.2* 7.9* 8.0*                   Imaging       Assessment/Plan  Left leg cellulitis- (present on admission)  Assessment & Plan  On rocephin  On vancomycin  dced iv fluid  Wbc has trended down  Ct of the knee showed:1.  No evidence of acute fracture or bony destructive change.     2.  Edema/induration of the subcutaneous fat anterior to the knee and extending proximally and distally. This is is most prominent seen overlying the patella. This likely represents cellulitis.  Some bursal fluid within the prepatellar bursa could be   present as well.     3.  Mild tricompartment degenerative change of the knee.     4.  Benign-appearing osteochondroma emanating from the proximal fibula posteriorly.     5.  No evidence of soft tissue abscess.     6.  Globular calcification lateral to the joint space possibly representing calcium hydroxyapatite deposition versus small intra-articular loose bodies.  .    Bipolar disorder (HCC)- (present on admission)  Assessment & Plan  Trial Abilify with PRN Haldol    HTN (hypertension)- (present on admission)  Assessment & Plan  Cont norvasc       VTE prophylaxis: lovenox

## 2020-09-14 PROBLEM — E87.1 HYPONATREMIA: Status: ACTIVE | Noted: 2020-09-14

## 2020-09-14 LAB
ALBUMIN SERPL BCP-MCNC: 2 G/DL (ref 3.2–4.9)
ALBUMIN/GLOB SERPL: 0.5 G/DL
ALP SERPL-CCNC: 60 U/L (ref 30–99)
ALT SERPL-CCNC: 38 U/L (ref 2–50)
ANION GAP SERPL CALC-SCNC: 9 MMOL/L (ref 7–16)
AST SERPL-CCNC: 50 U/L (ref 12–45)
BASOPHILS # BLD AUTO: 0.3 % (ref 0–1.8)
BASOPHILS # BLD: 0.03 K/UL (ref 0–0.12)
BILIRUB SERPL-MCNC: 0.5 MG/DL (ref 0.1–1.5)
BUN SERPL-MCNC: 7 MG/DL (ref 8–22)
CALCIUM SERPL-MCNC: 7.9 MG/DL (ref 8.5–10.5)
CHLORIDE SERPL-SCNC: 101 MMOL/L (ref 96–112)
CO2 SERPL-SCNC: 19 MMOL/L (ref 20–33)
CREAT SERPL-MCNC: 0.55 MG/DL (ref 0.5–1.4)
EOSINOPHIL # BLD AUTO: 0.03 K/UL (ref 0–0.51)
EOSINOPHIL NFR BLD: 0.3 % (ref 0–6.9)
ERYTHROCYTE [DISTWIDTH] IN BLOOD BY AUTOMATED COUNT: 44.7 FL (ref 35.9–50)
GLOBULIN SER CALC-MCNC: 4.2 G/DL (ref 1.9–3.5)
GLUCOSE SERPL-MCNC: 98 MG/DL (ref 65–99)
HCT VFR BLD AUTO: 38 % (ref 37–47)
HGB BLD-MCNC: 12.9 G/DL (ref 12–16)
IMM GRANULOCYTES # BLD AUTO: 0.09 K/UL (ref 0–0.11)
IMM GRANULOCYTES NFR BLD AUTO: 0.8 % (ref 0–0.9)
LYMPHOCYTES # BLD AUTO: 1.23 K/UL (ref 1–4.8)
LYMPHOCYTES NFR BLD: 11.2 % (ref 22–41)
MCH RBC QN AUTO: 32.4 PG (ref 27–33)
MCHC RBC AUTO-ENTMCNC: 33.9 G/DL (ref 33.6–35)
MCV RBC AUTO: 95.5 FL (ref 81.4–97.8)
MONOCYTES # BLD AUTO: 1.53 K/UL (ref 0–0.85)
MONOCYTES NFR BLD AUTO: 13.9 % (ref 0–13.4)
NEUTROPHILS # BLD AUTO: 8.06 K/UL (ref 2–7.15)
NEUTROPHILS NFR BLD: 73.5 % (ref 44–72)
NRBC # BLD AUTO: 0 K/UL
NRBC BLD-RTO: 0 /100 WBC
PLATELET # BLD AUTO: 156 K/UL (ref 164–446)
PMV BLD AUTO: 8.7 FL (ref 9–12.9)
POTASSIUM SERPL-SCNC: 3.6 MMOL/L (ref 3.6–5.5)
PROT SERPL-MCNC: 6.2 G/DL (ref 6–8.2)
RBC # BLD AUTO: 3.98 M/UL (ref 4.2–5.4)
SODIUM SERPL-SCNC: 129 MMOL/L (ref 135–145)
WBC # BLD AUTO: 11 K/UL (ref 4.8–10.8)

## 2020-09-14 PROCEDURE — 770006 HCHG ROOM/CARE - MED/SURG/GYN SEMI*

## 2020-09-14 PROCEDURE — 700105 HCHG RX REV CODE 258: Performed by: HOSPITALIST

## 2020-09-14 PROCEDURE — 36415 COLL VENOUS BLD VENIPUNCTURE: CPT

## 2020-09-14 PROCEDURE — 85025 COMPLETE CBC W/AUTO DIFF WBC: CPT

## 2020-09-14 PROCEDURE — 700102 HCHG RX REV CODE 250 W/ 637 OVERRIDE(OP): Performed by: INTERNAL MEDICINE

## 2020-09-14 PROCEDURE — 80053 COMPREHEN METABOLIC PANEL: CPT

## 2020-09-14 PROCEDURE — A9270 NON-COVERED ITEM OR SERVICE: HCPCS | Performed by: FAMILY MEDICINE

## 2020-09-14 PROCEDURE — 700102 HCHG RX REV CODE 250 W/ 637 OVERRIDE(OP): Performed by: FAMILY MEDICINE

## 2020-09-14 PROCEDURE — 99232 SBSQ HOSP IP/OBS MODERATE 35: CPT | Performed by: FAMILY MEDICINE

## 2020-09-14 PROCEDURE — 700111 HCHG RX REV CODE 636 W/ 250 OVERRIDE (IP): Performed by: INTERNAL MEDICINE

## 2020-09-14 PROCEDURE — 700111 HCHG RX REV CODE 636 W/ 250 OVERRIDE (IP): Performed by: HOSPITALIST

## 2020-09-14 PROCEDURE — A9270 NON-COVERED ITEM OR SERVICE: HCPCS | Performed by: INTERNAL MEDICINE

## 2020-09-14 PROCEDURE — 700105 HCHG RX REV CODE 258

## 2020-09-14 RX ORDER — SODIUM CHLORIDE 9 MG/ML
INJECTION, SOLUTION INTRAVENOUS
Status: COMPLETED
Start: 2020-09-14 | End: 2020-09-15

## 2020-09-14 RX ADMIN — VANCOMYCIN HYDROCHLORIDE 1750 MG: 500 INJECTION, POWDER, LYOPHILIZED, FOR SOLUTION INTRAVENOUS at 02:40

## 2020-09-14 RX ADMIN — TRAMADOL HYDROCHLORIDE 50 MG: 50 TABLET, FILM COATED ORAL at 17:44

## 2020-09-14 RX ADMIN — ARIPIPRAZOLE 2 MG: 2 TABLET ORAL at 17:44

## 2020-09-14 RX ADMIN — TRAMADOL HYDROCHLORIDE 50 MG: 50 TABLET, FILM COATED ORAL at 02:43

## 2020-09-14 RX ADMIN — ENOXAPARIN SODIUM 40 MG: 40 INJECTION SUBCUTANEOUS at 05:11

## 2020-09-14 RX ADMIN — AMLODIPINE BESYLATE 10 MG: 10 TABLET ORAL at 05:11

## 2020-09-14 RX ADMIN — DOCUSATE SODIUM 50 MG AND SENNOSIDES 8.6 MG 2 TABLET: 8.6; 5 TABLET, FILM COATED ORAL at 05:11

## 2020-09-14 RX ADMIN — ACETAMINOPHEN 650 MG: 325 TABLET, FILM COATED ORAL at 17:44

## 2020-09-14 RX ADMIN — CEFTRIAXONE SODIUM 2 G: 2 INJECTION, POWDER, FOR SOLUTION INTRAMUSCULAR; INTRAVENOUS at 05:11

## 2020-09-14 RX ADMIN — SODIUM CHLORIDE 500 ML: 9 INJECTION, SOLUTION INTRAVENOUS at 05:11

## 2020-09-14 ASSESSMENT — ENCOUNTER SYMPTOMS
PHOTOPHOBIA: 0
FEVER: 0
CHILLS: 0
NAUSEA: 0
CLAUDICATION: 0
ORTHOPNEA: 0
HEMOPTYSIS: 0
SHORTNESS OF BREATH: 0
EYE PAIN: 0
DOUBLE VISION: 0
VOMITING: 0
HEADACHES: 0
TREMORS: 0
TINGLING: 0
PALPITATIONS: 0
ABDOMINAL PAIN: 0
SPUTUM PRODUCTION: 0

## 2020-09-14 ASSESSMENT — PAIN DESCRIPTION - PAIN TYPE
TYPE: ACUTE PAIN

## 2020-09-14 NOTE — DISCHARGE PLANNING
Anticipated Discharge Disposition: TBD    Action:   · RN CM assessed the pt at bedside  · RN CM established with pt with PCP appointment with [facility] internal medicine at [address] [facility]. Appointment made for Monday, 9/2 @ 10:40am with Dr. Graham. Pt informed of appointment time and location.   · Completed chart review  · Pt admitted with Left leg cellulitis  · IV Rocephin  · Pt has PMH of bipolar disorder. Tx Abilify    Barriers to Discharge: medical clearance    Plan: Continue to collaborate with the pt, pt's family, and health care team to provide social and discharge support as needed.      Assessment:    RN CM spoke with the patient at bedside to obtain the information used in this assessment. Patient verified the accuracy of the demographic face sheet.     Pt states she will dc to live with her mother, Genny, at [address] [facility], [address] [place] [address]; this is a fifth floor, single level apartment with elevator access.     Pt states she was completley independent with all ADLS/IADLS, prior to this hospitalization. Pt states she uses the bus system for transport.     Pt states history of methamphetamine and marijuana use in the past with 2 years of sobriety.  Pt states she has a history of bi polar disorder.      Pt states she feels safe and supported living in her mother's home.     Care Transition Team Assessment    Information Source  Orientation : Oriented x 4  Information Given By: Patient  Informant's Name: Rae Latif  Who is responsible for making decisions for patient? : Patient    Readmission Evaluation  Is this a readmission?: No    Elopement Risk  Legal Hold: No  Ambulatory or Self Mobile in Wheelchair: Yes  Disoriented: No  Psychiatric Symptoms: None  History of Wandering: No  Elopement this Admit: No  Vocalizing Wanting to Leave: No  Displays Behaviors, Body Language Wanting to Leave: No-Not at Risk for Elopement  Elopement Risk: Not at Risk for Elopement    Interdisciplinary Discharge  Planning  Does Admitting Nurse Feel This Could be a Complex Discharge?: No  Primary Care Physician: scheduled pt with Dr. Graham with [facility]   Lives with - Patient's Self Care Capacity: Alone and Able to Care For Self  Patient or legal guardian wants to designate a caregiver: No  Support Systems: Family Member(s)  Housing / Facility: 1 Story Apartment / Condo  Mobility Issues: No  Prior Services: None  Assistance Needed: No  Durable Medical Equipment: Unknown    Discharge Preparedness  What is your plan after discharge?: Uncertain - pending medical team collaboration  What are your discharge supports?: Parent  Prior Functional Level: Independent with Activities of Daily Living, Independent with Medication Management  Difficulity with ADLs: None  Difficulity with IADLs: None    Functional Assesment  Prior Functional Level: Independent with Activities of Daily Living, Independent with Medication Management    Finances  Financial Barriers to Discharge: No  Prescription Coverage: Yes    Vision / Hearing Impairment  Vision Impairment : No  Hearing Impairment : No         Advance Directive  Advance Directive?: None  Advance Directive offered?: AD Booklet refused    Domestic Abuse  Have you ever been the victim of abuse or violence?: No  Physical Abuse or Sexual Abuse: No  Verbal Abuse or Emotional Abuse: No  Possible Abuse/Neglect Reported to:: Not Applicable    Psychological Assessment  History of Substance Abuse: Amphetamines, Marijuana  Date Last Used - Amphetamines: sober for 2 years  Date Last Used - Marijuana: sober for 2 years  History of Psychiatric Problems: Yes  Non-compliant with Treatment: No  Newly Diagnosed Illness: Yes    Discharge Risks or Barriers  Discharge risks or barriers?: No PCP  Patient risk factors: No PCP    Anticipated Discharge Information  Discharge Address: [address] [place] [address]  Discharge Contact Phone Number: [phone]

## 2020-09-14 NOTE — PROGRESS NOTES
Assumed care at 0700    Received report from NOC shift RN.    Reviewed recent lab results, notes, orders, and MAR  POC discussed and updated on care board  Bed is in the lowest and locked position, call light within reach      A&Ox4  RA  Tolerating diet   New order for fluid restriction 1000ml/24hrs  Patient is a stand by assist with FWW  Left knee laceration is healing   Redness is decreasing in size  Denies pain intervention at this time  +voiding  +bm

## 2020-09-14 NOTE — PROGRESS NOTES
[facility] Medicine Daily Progress Note    Date of Service  9/14/2020    Chief Complaint  49 y.o. female admitted 9/11/2020 with falling to left her knee resulting in abrasion which has become increasingly painful, erythemic but without joint pain.    Hospital Course    49 y.o. female who presented 9/11/2020 with history of homelessness with COPD,  tobacco and marijuana use.  She presents 4 days after falling to left her knee resulting in abrasion which has become increasingly painful, erythemic but without joint pain.  She admits subjective fever and chills prompting evaluation emergency department where she is found to have left leg cellulitis and leukocytosis.  She started on IV Rocephin and referred to the hospitalist for admission.  She denies history of MRSA, recent use of antibiotics or any routine daily medications.    Interval Problem Update  Wbc has trended down    Consultants/Specialty  none    Code Status  Full Code    Disposition  pending    Review of Systems  Review of Systems   Constitutional: Negative for chills, fever and malaise/fatigue.   HENT: Negative for ear discharge, ear pain and nosebleeds.    Eyes: Negative for double vision, photophobia and pain.   Respiratory: Negative for hemoptysis, sputum production and shortness of breath.    Cardiovascular: Negative for palpitations, orthopnea and claudication.   Gastrointestinal: Negative for abdominal pain, nausea and vomiting.   Genitourinary: Negative for frequency, hematuria and urgency.   Musculoskeletal: Positive for joint pain (knee).   Skin: Positive for rash (on the knee).   Neurological: Negative for tingling, tremors and headaches.        Physical Exam  Temp:  [36.6 °C (97.8 °F)-37.7 °C (99.9 °F)] 36.6 °C (97.8 °F)  Pulse:  [] 95  Resp:  [18-19] 18  BP: (127-134)/(80-85) 128/80  SpO2:  [95 %-99 %] 99 %    Physical Exam  Constitutional:       General: She is not in acute distress.     Appearance: She is obese. She is not  toxic-appearing.   HENT:      Head: Normocephalic and atraumatic.      Nose: No congestion or rhinorrhea.      Mouth/Throat:      Mouth: Mucous membranes are dry.   Eyes:      Conjunctiva/sclera: Conjunctivae normal.      Pupils: Pupils are equal, round, and reactive to light.   Neck:      Musculoskeletal: No neck rigidity or muscular tenderness.   Cardiovascular:      Rate and Rhythm: Normal rate and regular rhythm.      Heart sounds: No murmur. No friction rub.   Pulmonary:      Effort: No respiratory distress.      Breath sounds: No stridor.   Abdominal:      Palpations: Abdomen is soft.      Comments: obese   Musculoskeletal:         General: Swelling (in left knee) present.   Skin:     General: Skin is warm and dry.      Findings: Erythema (on the knee) present.   Neurological:      General: No focal deficit present.      Mental Status: She is oriented to person, place, and time.         Fluids    Intake/Output Summary (Last 24 hours) at 9/14/2020 1104  Last data filed at 9/14/2020 0800  Gross per 24 hour   Intake 600 ml   Output 2050 ml   Net -1450 ml       Laboratory  Recent Labs     09/12/20  0300 09/13/20  0737 09/14/20  0914   WBC 22.6* 15.1* 11.0*   RBC 3.73* 4.01* 3.98*   HEMOGLOBIN 12.3 13.1 12.9   HEMATOCRIT 36.7* 39.9 38.0   MCV 98.4* 99.5* 95.5   MCH 33.0 32.7 32.4   MCHC 33.5* 32.8* 33.9   RDW 46.4 47.7 44.7   PLATELETCT 164 160* 156*   MPV 9.3 9.4 8.7*     Recent Labs     09/12/20  0300 09/13/20  0737 09/14/20  0914   SODIUM 134* 127* 129*   POTASSIUM 3.9 3.7 3.6   CHLORIDE 104 98 101   CO2 21 20 19*   GLUCOSE 123* 83 98   BUN 16 8 7*   CREATININE 0.77 0.62 0.55   CALCIUM 7.9* 8.0* 7.9*                   Imaging       Assessment/Plan  Left leg cellulitis- (present on admission)  Assessment & Plan  On rocephin  On vancomycin  dced iv fluid  Wbc has trended down  Decrease in redness on the left knee is noted  Ct of the knee showed:1.  No evidence of acute fracture or bony destructive change.     2.   Edema/induration of the subcutaneous fat anterior to the knee and extending proximally and distally. This is is most prominent seen overlying the patella. This likely represents cellulitis. Some bursal fluid within the prepatellar bursa could be   present as well.     3.  Mild tricompartment degenerative change of the knee.     4.  Benign-appearing osteochondroma emanating from the proximal fibula posteriorly.     5.  No evidence of soft tissue abscess.     6.  Globular calcification lateral to the joint space possibly representing calcium hydroxyapatite deposition versus small intra-articular loose bodies.  .    Hyponatremia  Assessment & Plan  2nd to excess po fluid intake  Will restrict po fluid intake to 1000 ml per 24hrs    Bipolar disorder (HCC)- (present on admission)  Assessment & Plan  Trial Abilify with PRN Haldol    HTN (hypertension)- (present on admission)  Assessment & Plan  Cont norvasc       VTE prophylaxis: lovenox

## 2020-09-14 NOTE — PROGRESS NOTES
Bedside report received. Assessment completed.  Pt is A&O x4. Pt on room air.   Medicating for pain PRN per MAR Pain 6/10  Denies nausea.   - numbness, - tingling.  L knee lac WAN             Last BM PTA 9/11/20 . +flatus,   +void.  Cardiac diet. Tolerates well.   Pt has purewick in place; refusing to get up to bedside commode due to pain  Call light and belongings within reach. All needs met at this time. Fall Precautions and hourly rounding in place.

## 2020-09-14 NOTE — CARE PLAN
Problem: Safety  Goal: Will remain free from injury  Outcome: PROGRESSING AS EXPECTED   Fall precautions in place  Problem: Communication  Goal: The ability to communicate needs accurately and effectively will improve  Outcome: PROGRESSING AS EXPECTED   Participation in POC  Problem: Pain Management  Goal: Pain level will decrease to patient's comfort goal  Outcome: PROGRESSING SLOWER THAN EXPECTED   Pain is well controlled with medication per MAR

## 2020-09-14 NOTE — CARE PLAN
Problem: Safety  Goal: Will remain free from falls  Outcome: PROGRESSING AS EXPECTED     Problem: Pain Management  Goal: Pain level will decrease to patient's comfort goal  Outcome: PROGRESSING AS EXPECTED     Problem: Bowel/Gastric:  Goal: Normal bowel function is maintained or improved  9/14/2020 0014 by Olimpia Hobson R.N.

## 2020-09-15 ENCOUNTER — PHARMACY VISIT (OUTPATIENT)
Dept: PHARMACY | Facility: MEDICAL CENTER | Age: 50
End: 2020-09-15
Payer: COMMERCIAL

## 2020-09-15 ENCOUNTER — PATIENT OUTREACH (OUTPATIENT)
Dept: HEALTH INFORMATION MANAGEMENT | Facility: OTHER | Age: 50
End: 2020-09-15

## 2020-09-15 VITALS
DIASTOLIC BLOOD PRESSURE: 83 MMHG | BODY MASS INDEX: 39.07 KG/M2 | WEIGHT: 248.9 LBS | OXYGEN SATURATION: 98 % | HEART RATE: 96 BPM | SYSTOLIC BLOOD PRESSURE: 127 MMHG | TEMPERATURE: 96.6 F | RESPIRATION RATE: 18 BRPM | HEIGHT: 67 IN

## 2020-09-15 LAB
ALBUMIN SERPL BCP-MCNC: 2.1 G/DL (ref 3.2–4.9)
ALBUMIN/GLOB SERPL: 0.5 G/DL
ALP SERPL-CCNC: 60 U/L (ref 30–99)
ALT SERPL-CCNC: 33 U/L (ref 2–50)
ANION GAP SERPL CALC-SCNC: 11 MMOL/L (ref 7–16)
AST SERPL-CCNC: 50 U/L (ref 12–45)
BASOPHILS # BLD AUTO: 0.4 % (ref 0–1.8)
BASOPHILS # BLD: 0.04 K/UL (ref 0–0.12)
BILIRUB SERPL-MCNC: 0.4 MG/DL (ref 0.1–1.5)
BUN SERPL-MCNC: 6 MG/DL (ref 8–22)
CALCIUM SERPL-MCNC: 7.8 MG/DL (ref 8.5–10.5)
CHLORIDE SERPL-SCNC: 103 MMOL/L (ref 96–112)
CO2 SERPL-SCNC: 22 MMOL/L (ref 20–33)
CREAT SERPL-MCNC: 0.44 MG/DL (ref 0.5–1.4)
EOSINOPHIL # BLD AUTO: 0.1 K/UL (ref 0–0.51)
EOSINOPHIL NFR BLD: 1 % (ref 0–6.9)
ERYTHROCYTE [DISTWIDTH] IN BLOOD BY AUTOMATED COUNT: 44.7 FL (ref 35.9–50)
GLOBULIN SER CALC-MCNC: 4 G/DL (ref 1.9–3.5)
GLUCOSE SERPL-MCNC: 78 MG/DL (ref 65–99)
HCT VFR BLD AUTO: 37.8 % (ref 37–47)
HGB BLD-MCNC: 12.9 G/DL (ref 12–16)
IMM GRANULOCYTES # BLD AUTO: 0.09 K/UL (ref 0–0.11)
IMM GRANULOCYTES NFR BLD AUTO: 0.9 % (ref 0–0.9)
LYMPHOCYTES # BLD AUTO: 2.16 K/UL (ref 1–4.8)
LYMPHOCYTES NFR BLD: 20.6 % (ref 22–41)
MCH RBC QN AUTO: 32.3 PG (ref 27–33)
MCHC RBC AUTO-ENTMCNC: 34.1 G/DL (ref 33.6–35)
MCV RBC AUTO: 94.7 FL (ref 81.4–97.8)
MONOCYTES # BLD AUTO: 1.15 K/UL (ref 0–0.85)
MONOCYTES NFR BLD AUTO: 11 % (ref 0–13.4)
NEUTROPHILS # BLD AUTO: 6.96 K/UL (ref 2–7.15)
NEUTROPHILS NFR BLD: 66.1 % (ref 44–72)
NRBC # BLD AUTO: 0 K/UL
NRBC BLD-RTO: 0 /100 WBC
PLATELET # BLD AUTO: 182 K/UL (ref 164–446)
PMV BLD AUTO: 9.1 FL (ref 9–12.9)
POTASSIUM SERPL-SCNC: 3.4 MMOL/L (ref 3.6–5.5)
PROT SERPL-MCNC: 6.1 G/DL (ref 6–8.2)
RBC # BLD AUTO: 3.99 M/UL (ref 4.2–5.4)
SODIUM SERPL-SCNC: 136 MMOL/L (ref 135–145)
WBC # BLD AUTO: 10.5 K/UL (ref 4.8–10.8)

## 2020-09-15 PROCEDURE — 700105 HCHG RX REV CODE 258: Performed by: HOSPITALIST

## 2020-09-15 PROCEDURE — 700111 HCHG RX REV CODE 636 W/ 250 OVERRIDE (IP): Performed by: HOSPITALIST

## 2020-09-15 PROCEDURE — 700111 HCHG RX REV CODE 636 W/ 250 OVERRIDE (IP): Performed by: INTERNAL MEDICINE

## 2020-09-15 PROCEDURE — A9270 NON-COVERED ITEM OR SERVICE: HCPCS | Performed by: INTERNAL MEDICINE

## 2020-09-15 PROCEDURE — 700102 HCHG RX REV CODE 250 W/ 637 OVERRIDE(OP): Performed by: INTERNAL MEDICINE

## 2020-09-15 PROCEDURE — 80053 COMPREHEN METABOLIC PANEL: CPT

## 2020-09-15 PROCEDURE — 36415 COLL VENOUS BLD VENIPUNCTURE: CPT

## 2020-09-15 PROCEDURE — 99239 HOSP IP/OBS DSCHRG MGMT >30: CPT | Performed by: FAMILY MEDICINE

## 2020-09-15 PROCEDURE — A9270 NON-COVERED ITEM OR SERVICE: HCPCS | Performed by: FAMILY MEDICINE

## 2020-09-15 PROCEDURE — 700102 HCHG RX REV CODE 250 W/ 637 OVERRIDE(OP): Performed by: FAMILY MEDICINE

## 2020-09-15 PROCEDURE — RXMED WILLOW AMBULATORY MEDICATION CHARGE: Performed by: FAMILY MEDICINE

## 2020-09-15 PROCEDURE — 85025 COMPLETE CBC W/AUTO DIFF WBC: CPT

## 2020-09-15 RX ORDER — ARIPIPRAZOLE 2 MG/1
2 TABLET ORAL EVERY EVENING
Qty: 30 TAB | Refills: 0 | Status: SHIPPED | OUTPATIENT
Start: 2020-09-15

## 2020-09-15 RX ORDER — ACETAMINOPHEN 325 MG/1
650 TABLET ORAL EVERY 4 HOURS PRN
Qty: 30 TAB | Refills: 0 | Status: SHIPPED | OUTPATIENT
Start: 2020-09-15

## 2020-09-15 RX ORDER — CEFDINIR 300 MG/1
300 CAPSULE ORAL 2 TIMES DAILY
Qty: 14 CAP | Refills: 0 | Status: SHIPPED | OUTPATIENT
Start: 2020-09-15

## 2020-09-15 RX ORDER — AMLODIPINE BESYLATE 10 MG/1
10 TABLET ORAL DAILY
Qty: 30 TAB | Refills: 0 | Status: SHIPPED | OUTPATIENT
Start: 2020-09-16 | End: 2022-10-22 | Stop reason: SDUPTHER

## 2020-09-15 RX ORDER — POTASSIUM CHLORIDE 20 MEQ/1
40 TABLET, EXTENDED RELEASE ORAL ONCE
Status: COMPLETED | OUTPATIENT
Start: 2020-09-15 | End: 2020-09-15

## 2020-09-15 RX ADMIN — CEFTRIAXONE SODIUM 2 G: 2 INJECTION, POWDER, FOR SOLUTION INTRAMUSCULAR; INTRAVENOUS at 05:14

## 2020-09-15 RX ADMIN — AMLODIPINE BESYLATE 10 MG: 10 TABLET ORAL at 05:14

## 2020-09-15 RX ADMIN — ENOXAPARIN SODIUM 40 MG: 40 INJECTION SUBCUTANEOUS at 05:14

## 2020-09-15 RX ADMIN — DOCUSATE SODIUM 50 MG AND SENNOSIDES 8.6 MG 2 TABLET: 8.6; 5 TABLET, FILM COATED ORAL at 05:14

## 2020-09-15 RX ADMIN — POTASSIUM CHLORIDE 40 MEQ: 1500 TABLET, EXTENDED RELEASE ORAL at 09:22

## 2020-09-15 SDOH — ECONOMIC STABILITY: FOOD INSECURITY: WITHIN THE PAST 12 MONTHS, YOU WORRIED THAT YOUR FOOD WOULD RUN OUT BEFORE YOU GOT MONEY TO BUY MORE.: SOMETIMES TRUE

## 2020-09-15 SDOH — SOCIAL STABILITY: SOCIAL NETWORK: HOW OFTEN DO YOU GET TOGETHER WITH FRIENDS OR RELATIVES?: ONCE A WEEK

## 2020-09-15 SDOH — SOCIAL STABILITY: SOCIAL NETWORK: ARE YOU MARRIED, WIDOWED, DIVORCED, SEPARATED, NEVER MARRIED, OR LIVING WITH A PARTNER?: NEVER MARRIED

## 2020-09-15 SDOH — ECONOMIC STABILITY: FOOD INSECURITY: WITHIN THE PAST 12 MONTHS, THE FOOD YOU BOUGHT JUST DIDN'T LAST AND YOU DIDN'T HAVE MONEY TO GET MORE.: NEVER TRUE

## 2020-09-15 SDOH — ECONOMIC STABILITY: TRANSPORTATION INSECURITY
IN THE PAST 12 MONTHS, HAS THE LACK OF TRANSPORTATION KEPT YOU FROM MEDICAL APPOINTMENTS OR FROM GETTING MEDICATIONS?: NO

## 2020-09-15 SDOH — ECONOMIC STABILITY: INCOME INSECURITY: HOW HARD IS IT FOR YOU TO PAY FOR THE VERY BASICS LIKE FOOD, HOUSING, MEDICAL CARE, AND HEATING?: VERY HARD

## 2020-09-15 SDOH — HEALTH STABILITY: MENTAL HEALTH
STRESS IS WHEN SOMEONE FEELS TENSE, NERVOUS, ANXIOUS, OR CAN'T SLEEP AT NIGHT BECAUSE THEIR MIND IS TROUBLED. HOW STRESSED ARE YOU?: ONLY A LITTLE

## 2020-09-15 SDOH — SOCIAL STABILITY: SOCIAL NETWORK
DO YOU BELONG TO ANY CLUBS OR ORGANIZATIONS SUCH AS CHURCH GROUPS UNIONS, FRATERNAL OR ATHLETIC GROUPS, OR SCHOOL GROUPS?: NO

## 2020-09-15 SDOH — SOCIAL STABILITY: SOCIAL INSECURITY: WITHIN THE LAST YEAR, HAVE YOU BEEN HUMILIATED OR EMOTIONALLY ABUSED IN OTHER WAYS BY YOUR PARTNER OR EX-PARTNER?: NO

## 2020-09-15 SDOH — SOCIAL STABILITY: SOCIAL INSECURITY: WITHIN THE LAST YEAR, HAVE YOU BEEN AFRAID OF YOUR PARTNER OR EX-PARTNER?: NO

## 2020-09-15 SDOH — SOCIAL STABILITY: SOCIAL INSECURITY
WITHIN THE LAST YEAR, HAVE YOU BEEN KICKED, HIT, SLAPPED, OR OTHERWISE PHYSICALLY HURT BY YOUR PARTNER OR EX-PARTNER?: NO

## 2020-09-15 SDOH — ECONOMIC STABILITY: TRANSPORTATION INSECURITY
IN THE PAST 12 MONTHS, HAS LACK OF TRANSPORTATION KEPT YOU FROM MEETINGS, WORK, OR FROM GETTING THINGS NEEDED FOR DAILY LIVING?: NO

## 2020-09-15 SDOH — SOCIAL STABILITY: SOCIAL INSECURITY
WITHIN THE LAST YEAR, HAVE TO BEEN RAPED OR FORCED TO HAVE ANY KIND OF SEXUAL ACTIVITY BY YOUR PARTNER OR EX-PARTNER?: NO

## 2020-09-15 SDOH — SOCIAL STABILITY: SOCIAL NETWORK: HOW OFTEN DO YOU ATTEND CHURCH OR RELIGIOUS SERVICES?: MORE THAN 4 TIMES PER YEAR

## 2020-09-15 SDOH — SOCIAL STABILITY: SOCIAL NETWORK: HOW OFTEN DO YOU ATTENT MEETINGS OF THE CLUB OR ORGANIZATION YOU BELONG TO?: NEVER

## 2020-09-15 SDOH — SOCIAL STABILITY: SOCIAL NETWORK: IN A TYPICAL WEEK, HOW MANY TIMES DO YOU TALK ON THE PHONE WITH FAMILY, FRIENDS, OR NEIGHBORS?: THREE TIMES A WEEK

## 2020-09-15 SDOH — HEALTH STABILITY: PHYSICAL HEALTH: ON AVERAGE, HOW MANY MINUTES DO YOU ENGAGE IN EXERCISE AT THIS LEVEL?: 30 MIN

## 2020-09-15 SDOH — HEALTH STABILITY: PHYSICAL HEALTH: ON AVERAGE, HOW MANY DAYS PER WEEK DO YOU ENGAGE IN MODERATE TO STRENUOUS EXERCISE (LIKE A BRISK WALK)?: 5 DAYS

## 2020-09-15 NOTE — PROGRESS NOTES
Bedside report received.  Assessment completed.  A&O x 4.   On 0L O2. Denies SOB.  Denies N/V and new numbness or tingling.  Patient ambulates with one assist and FWW. Bed alarm on.   Pain managed with prescribed medications.  Tolerating cardiac diet.  + void via purewick, + flatus, last BM 9/14.  Surgical incisions: laceration to L knee    Reviewed plan with of care with patient. Call light and personal belongings with in reach. Hourly rounding in place. All needs met at this time.

## 2020-09-15 NOTE — PROGRESS NOTES
Patient discharging to [facility].  Unable to get PT/Ot consult  Patient states she will be able to get help from her friends.

## 2020-09-15 NOTE — DISCHARGE PLANNING
Anticipated Discharge Disposition: Home with outpatient follow-up    Action:   · Completed chart review  · SELIN Varela alerted NATHAN Barboza RN, of pt's PCP appointment next week.     Barriers to Discharge: none     Plan: Continue to collaborate with the pt, pt's family, and health care team to provide social and discharge support as needed.      1520: RN CM found an emergency bed for the pt at a [facility], [address] [place] [address]. Pt refused this option and opted to be discharged to [facility] on [address].    1530: Spoke with RICCI Johnson admin, via phone to arrange an Uber for the pt; NATHAN Barboza RN, contact number provided for f/u.

## 2020-09-15 NOTE — CARE PLAN
Problem: Pain Management  Goal: Pain level will decrease to patient's comfort goal  Outcome: PROGRESSING AS EXPECTED     Problem: Bowel/Gastric:  Goal: Normal bowel function is maintained or improved  Outcome: PROGRESSING AS EXPECTED

## 2020-09-15 NOTE — CARE PLAN
Problem: Safety  Goal: Will remain free from injury  Outcome: PROGRESSING AS EXPECTED   Fall precautions in place  Problem: Pain Management  Goal: Pain level will decrease to patient's comfort goal  Outcome: PROGRESSING AS EXPECTED   Pain is well controlled with medication per MAR  Problem: Infection  Goal: Will remain free from infection  Outcome: PROGRESSING AS EXPECTED   Patient's WBC is trending down  Problem: Communication  Goal: The ability to communicate needs accurately and effectively will improve  Outcome: PROGRESSING AS EXPECTED   Participation in POC

## 2020-09-15 NOTE — DISCHARGE SUMMARY
Discharge Summary    CHIEF COMPLAINT ON ADMISSION  Chief Complaint   Patient presents with   • Knee Pain       Reason for Admission  Left leg cellulitis     Admission Date  9/11/2020    CODE STATUS  Full Code    HPI & HOSPITAL COURSE  This is a 49 y.o. female here with  history of homelessness with COPD,  tobacco and marijuana use.  She presents 4 days after falling to left her knee resulting in abrasion which has become increasingly painful, erythemic but without joint pain.  She admits subjective fever and chills prompting evaluation emergency department where she is found to have left leg cellulitis and leukocytosis.  She started on IV Rocephin and referred to the hospitalist for admission.  She denies history of MRSA, recent use of antibiotics or any routine daily medications.   she was admitted with left knee and leg cellulitis and was started on iv antibiotics.  Her left knee ct showed:history of homelessness with COPD,  tobacco and marijuana use.  She presents 4 days after falling to left her knee resulting in abrasion which has become increasingly painful, erythemic but without joint pain.  She admits subjective fever and chills prompting evaluation emergency department where she is found to have left leg cellulitis and leukocytosis.  She started on IV Rocephin and referred to the hospitalist for admission.  She denies history of MRSA, recent use of antibiotics or any routine daily medications.   HOSPITAL ID team saw the pt and recommended to dc vancomycin.her cellulitis has improved a lot and she feesl better and she will be discharged home with cefdinir 300mg bid for 7 more days.  She also was noted to have hyponatremia 2nd to excessive po fluid intake and restricted her po fluid intake and It has corrected.  I also had d/w the pt not to drink fluid excessively.      Therefore, she is discharged in good and stable condition to home with close outpatient follow-up.    The patient met 2-midnight criteria for  an inpatient stay at the time of discharge.    Discharge Date  9/15/20    FOLLOW UP ITEMS POST DISCHARGE  pcp next week    DISCHARGE DIAGNOSES  Active Problems:    Left leg cellulitis POA: Yes    HTN (hypertension) POA: Yes    Bipolar disorder (HCC) POA: Yes    Hyponatremia POA: Unknown  Resolved Problems:    * No resolved hospital problems. *      FOLLOW UP  No future appointments.  No follow-up provider specified.    MEDICATIONS ON DISCHARGE     Medication List      START taking these medications      Instructions   acetaminophen 325 MG Tabs  Commonly known as: TYLENOL   Take 2 Tabs by mouth every four hours as needed.  Dose: 650 mg     ARIPiprazole 2 MG tablet  Commonly known as: ABILIFY   Take 1 Tab by mouth every evening.  Dose: 2 mg     cefdinir 300 MG Caps  Commonly known as: OMNICEF   Take 1 Cap by mouth 2 times a day.  Dose: 300 mg        CONTINUE taking these medications      Instructions   amLODIPine 10 MG Tabs  Start taking on: September 16, 2020  Commonly known as: NORVASC   Take 1 Tab by mouth every day.  Dose: 10 mg            Allergies  Allergies   Allergen Reactions   • Lisinopril      DIZZINESS       DIET  Orders Placed This Encounter   Procedures   • Diet Order Cardiac     Standing Status:   Standing     Number of Occurrences:   1     Order Specific Question:   Diet:     Answer:   Cardiac [6]       ACTIVITY  As tolerated.  Weight bearing as tolerated    CONSULTATIONS  none    PROCEDURES  none    LABORATORY  Lab Results   Component Value Date    SODIUM 136 09/15/2020    POTASSIUM 3.4 (L) 09/15/2020    CHLORIDE 103 09/15/2020    CO2 22 09/15/2020    GLUCOSE 78 09/15/2020    BUN 6 (L) 09/15/2020    CREATININE 0.44 (L) 09/15/2020        Lab Results   Component Value Date    WBC 10.5 09/15/2020    HEMOGLOBIN 12.9 09/15/2020    HEMATOCRIT 37.8 09/15/2020    PLATELETCT 182 09/15/2020        Total time of the discharge process exceeds 35 minutes.

## 2020-09-15 NOTE — PROGRESS NOTES
Community Care Management Intake  · Social determinates of health intake complete.   · Identified barriers to financial hardship, housing, food insecurity  · Contact information provided to Rae Latif   · Discharge Follow up:   · Inpatient assessment completed.     Patient is recently released from correction/shelter and is currently homeless. Prior to admission patient staying on the streets. Limited social support. At [place], she will be able to stay temporarily with her mother, who is disabled, and will not be able to provide any physical assistance for patient's care. Patient has active [place] medicaid, discussed [facility] house resources and recommended patient walk in to request info on housing and case management. Patient currently does not have any income. Patient is hoping she will receive walker prior to discharge. If not provided, will assist patient with Care Chest application.       Plan: Will mail Food is medicine Rx and list of resources, including [facility], [facility], [facility], [facility], [facility]. Follow up call after discharge.

## 2020-09-15 NOTE — DISCHARGE PLANNING
Medication reconcilliation completed. Medications delivered to RN at bedside. Written information regarding the dispensed prescriptions was provided to the patient including the phone number of the pharmacy to call for any questions.       Rae Latif   Home Medication Instructions JARVIS:[id number]    Printed on:09/15/20 1421   Medication Information                      acetaminophen (TYLENOL) 325 MG Tab  Take 2 Tabs by mouth every four hours as needed.             amLODIPine (NORVASC) 10 MG Tab  Take 1 Tab by mouth every day.             ARIPiprazole (ABILIFY) 2 MG tablet  Take 1 Tab by mouth every evening.             cefdinir (OMNICEF) 300 MG Cap  Take 1 Cap by mouth 2 times a day.

## 2020-09-15 NOTE — PROGRESS NOTES
Assumed care at 0700    Received report from NOC shift RN.    Reviewed recent lab results, notes, orders, and MAR  POC discussed and updated on care board  Bed is in the lowest and locked position, call light within reach      A&Ox4  RA  Denies pain at this time  Tolerating diet  +bm  +voiding  Left knee redness and warmth  Ambulates x1 assist with FWW  Bed alarm is on

## 2020-09-15 NOTE — LETTER
09/15/20  Rae Latif  [address]  [address]  [place] [address]    Dear Rae,    You recently indicated that you may be experiencing difficulty in obtaining healthy food. We would like to help by providing you access to healthy foods. Please find your food prescription enclosed.     Healthy Food is Good Medicine    Healthy food is important for good health. Nutritious food helps our bodies heal, keeps our hearts strong, and gives us the energy we need to lead active lives. But, healthy food isn’t always easy to come by. That’s why your doctor recommends using a Prescription Pantry. These pantries make sure you have the nutritious food you need to stay healthy.     Using a Prescription Pantry is simple:    1. Take the prescription to one of the eight pantries   2. Get healthy food and other assistance    You do not need to show proof of income. And, your prescription will be kept on file so you can visit more than one pantry with a single prescription. There is no limit to the number of different pantries you can use each month.    Have Questions? Please call one of the numbers below for assistance.     Griselda Britt   [phone]  Neal Sanchez  [phone]  Ronel Grant   [phone]    Sincerely,    Your Healthcare Team                  Food Prescription    Take this Food Prescription to any of the participating [facility] food pantries listed below for food assistance & resources. The food pantry will collect the tear-off section below.   With this prescription, you may visit each pantry once per week. There is no limit to the number of different pantries you can use.   Location Pantry Hours   [place] [facility]  [address] [place] 1st and 3rd Fridays 12-1pm   [facility]  [address] Wednes[facility] 10am-Noon; Saturdays 9-11am  4th Wednesday 5:30-7pm (March-Sep ONLY)   [facility]  [address] [place] Monday - Thursday 9am - 4pm   [facility]  [address], [place] Monday- Friday 8am-5pm  Clinic Patients ONLY   [facility]  [address], [place] Full pantry-  2nd & 4th Fridays 1-2pm  Emergency Bags- Monday-Thursday and 1st & 3rd Fridays 11-3pm   [facility]  [address], [place] Monday- Friday 8a-5p  [facility] Patients ONLY   [facility]  [address], [place] Monday, Tuesday & Thursday 1-3pm  3rd Wednesday 5-7pm   Your prescription expires on 09/15/21   Please schedule an appointment with your doctor to renew your prescription.     ?-------------------------------------------------------------------------------------------------------------------------------------     For Clinician and Food Pantry Use Only; to be removed by Food Pantry Personnel   Date: 09/15/20   Patient Name: Rae Latif   YOB: 1970   MRN: [id number]   Referring Clinician: Jordyn Jones R.N.   Prescription: Carbohydrate Controlled

## 2020-09-15 NOTE — DISCHARGE INSTRUCTIONS
Cellulitis, Adult    Cellulitis is a skin infection. The infected area is often warm, red, swollen, and sore. It occurs most often in the arms and lower legs. It is very important to get treated for this condition.  What are the causes?  This condition is caused by bacteria. The bacteria enter through a break in the skin, such as a cut, burn, insect bite, open sore, or crack.  What increases the risk?  This condition is more likely to occur in people who:  · Have a weak body defense system (immune system).  · Have open cuts, burns, bites, or scrapes on the skin.  · Are older than 60 years of age.  · Have a blood sugar problem (diabetes).  · Have a long-lasting (chronic) liver disease (cirrhosis) or kidney disease.  · Are very overweight (obese).  · Have a skin problem, such as:  ? Itchy rash (eczema).  ? Slow movement of blood in the veins (venous stasis).  ? Fluid buildup below the skin (edema).  · Have been treated with high-energy rays (radiation).  · Use IV drugs.  What are the signs or symptoms?  Symptoms of this condition include:  · Skin that is:  ? Red.  ? Streaking.  ? Spotting.  ? Swollen.  ? Sore or painful when you touch it.  ? Warm.  · A fever.  · Chills.  · Blisters.  How is this diagnosed?  This condition is diagnosed based on:  · Medical history.  · Physical exam.  · Blood tests.  · Imaging tests.  How is this treated?  Treatment for this condition may include:  · Medicines to treat infections or allergies.  · Home care, such as:  ? Rest.  ? Placing cold or warm cloths (compresses) on the skin.  · Hospital care, if the condition is very bad.  Follow these instructions at home:  Medicines  · Take over-the-counter and prescription medicines only as told by your doctor.  · If you were prescribed an antibiotic medicine, take it as told by your doctor. Do not stop taking it even if you start to feel better.  General instructions    · Drink enough fluid to keep your pee (urine) pale yellow.  · Do not touch  or rub the infected area.  · Raise (elevate) the infected area above the level of your heart while you are sitting or lying down.  · Place cold or warm cloths on the area as told by your doctor.  · Keep all follow-up visits as told by your doctor. This is important.  Contact a doctor if:  · You have a fever.  · You do not start to get better after 1-2 days of treatment.  · Your bone or joint under the infected area starts to hurt after the skin has healed.  · Your infection comes back. This can happen in the same area or another area.  · You have a swollen bump in the area.  · You have new symptoms.  · You feel ill and have muscle aches and pains.  Get help right away if:  · Your symptoms get worse.  · You feel very sleepy.  · You throw up (vomit) or have watery poop (diarrhea) for a long time.  · You see red streaks coming from the area.  · Your red area gets larger.  · Your red area turns dark in color.  These symptoms may represent a serious problem that is an emergency. Do not wait to see if the symptoms will go away. Get medical help right away. Call your local emergency services (911 in the [place] Do not drive yourself to the hospital.  Summary  · Cellulitis is a skin infection. The area is often warm, red, swollen, and sore.  · This condition is treated with medicines, rest, and cold and warm cloths.  · Take all medicines only as told by your doctor.  · Tell your doctor if symptoms do not start to get better after 1-2 days of treatment.  This information is not intended to replace advice given to you by your health care provider. Make sure you discuss any questions you have with your health care provider.  Document Released: 06/05/2009 Document Revised: 05/09/2019 Document Reviewed: 05/09/2019  [employer] Patient Education © 2020 Else[employer] Inc.        Antibiotic Medicine, Adult    Antibiotic medicines treat infections caused by a type of germ called bacteria. They work by killing the bacteria that make you  sick.  When do I need to take antibiotics?  You often need these medicines to treat bacterial infections, such as:  · A urinary tract infection (UTI).  · Strep throat.  · Meningitis. This affects the spinal cord and brain.  · A bad lung infection.  You may start the medicines while your doctor waits for tests to come back. When the tests come back, your doctor may change or stop your medicine.  When are antibiotics not needed?  You do not need these medicines for most common illnesses, such as:  · A cold.  · The flu.  · A sore throat.  Antibiotics are not always needed for all infections caused by bacteria. Do not ask for these medicines, or take them, when they are not needed.  What are the risks of taking antibiotics?  Most antibiotics can cause an infection called Clostridioides difficile (C. diff). This causes watery poop (diarrhea). Let your doctor know right away if:  · You have watery poop while taking an antibiotic.  · You have watery poop after you stop taking an antibiotic. The illness can happen weeks after you stop the medicine.  You also have a risk of getting an infection in the future that antibiotics cannot treat (antibiotic-resistant infection). This type of infection can be dangerous.  What else should I know about taking antibiotics?    · You need to take the entire prescription.  ? Take the medicine for as long as told by your doctor.  ? Do not stop taking it even if you start to feel better.  · Try not to miss any doses. If you miss a dose, call your doctor.  · Birth control pills may not work. If you take birth control pills:  ? Keep on taking them.  ? Use a second form of birth control, such as a condom. Do this for as long as told by your doctor.  · Ask your doctor:  ? How long to wait in between doses.  ? If you should take the medicine with food.  ? If there is anything you should stay away from while taking the antibiotic, such as:  ? Food.  ? Drinks.  ? Medicines.  ? If there are any side  effects you should watch for.  · Only take the medicines that your doctor told you to take. Do not take medicines that were given to someone else.  · Drink a large glass of water with the medicine.  · Ask the pharmacist for a tool to measure the medicine, such as:  ? A syringe.  ? A cup.  ? A spoon.  · Throw away any extra medicine.  Contact a doctor if:  · You get worse.  · You have new joint pain or muscle aches after starting the medicine.  · You have side effects from the medicine, such as:  ? Stomach pain.  ? Watery poop.  ? Feeling sick to your stomach (nausea).  Get help right away if:  · You have signs of a very bad allergic reaction. If this happens, stop taking the medicine right away. Signs may include:  ? Hives. These are raised, itchy, red bumps on the skin.  ? Skin rash.  ? Trouble breathing.  ? Wheezing.  ? Swelling.  ? Feeling dizzy.  ? Throwing up (vomiting).  · Your pee (urine) is dark, or is the color of blood.  · Your skin turns yellow.  · You bruise easily.  · You bleed easily.  · You have very bad watery poop and cramps in your belly.  · You have a very bad headache.  Summary  · Antibiotics are often used to treat infections caused by bacteria.  · Only take these medicines when needed.  · Let your doctor know if you have watery poop while taking an antibiotic.  · You need to take the entire prescription.  This information is not intended to replace advice given to you by your health care provider. Make sure you discuss any questions you have with your health care provider.  Document Released: 09/26/2009 Document Revised: 01/24/2020 Document Reviewed: 12/20/2017  Elsevier Patient Education © 2020 [employer]        Discharge Instructions    Discharged to home by taxi with self. Discharged via wheelchair, hospital escort: Yes.  Special equipment needed: Not Applicable    Be sure to schedule a follow-up appointment with your primary care doctor or any specialists as instructed.     Discharge  Plan:   Diet Plan: Discussed  Activity Level: Discussed  Confirmed Follow up Appointment: Appointment Scheduled  Confirmed Symptoms Management: Discussed  Medication Reconciliation Updated: Yes    I understand that a diet low in cholesterol, fat, and sodium is recommended for good health. Unless I have been given specific instructions below for another diet, I accept this instruction as my diet prescription.   Other diet: regular    Special Instructions: None    · Is patient discharged on Warfarin / Coumadin?   No     Depression / Suicide Risk    As you are discharged from this [facility] facility, it is important to learn how to keep safe from harming yourself.    Recognize the warning signs:  · Abrupt changes in personality, positive or negative- including increase in energy   · Giving away possessions  · Change in eating patterns- significant weight changes-  positive or negative  · Change in sleeping patterns- unable to sleep or sleeping all the time   · Unwillingness or inability to communicate  · Depression  · Unusual sadness, discouragement and loneliness  · Talk of wanting to die  · Neglect of personal appearance   · Rebelliousness- reckless behavior  · Withdrawal from people/activities they love  · Confusion- inability to concentrate     If you or a loved one observes any of these behaviors or has concerns about self-harm, here's what you can do:  · Talk about it- your feelings and reasons for harming yourself  · Remove any means that you might use to hurt yourself (examples: pills, rope, extension cords, firearm)  · Get professional help from the community (Mental Health, Substance Abuse, psychological counseling)  · Do not be alone:Call your Safe Contact- someone whom you trust who will be there for you.  · Call your local CRISIS HOTLINE 046-6923 or 531-256-7783  · Call your local Children's Mobile Crisis Response Team [place] (478) 665-6216 or www.Greats  · Call the toll free [employer]  Suicide Prevention Hotlines   · National Suicide Prevention Lifeline 164-847-EAXK (0110)  · National Hope Line Network 800-SUICIDE (561-6063)

## 2020-09-17 LAB
BACTERIA BLD CULT: NORMAL
SIGNIFICANT IND 70042: NORMAL
SITE SITE: NORMAL
SOURCE SOURCE: NORMAL

## 2020-09-29 NOTE — DOCUMENTATION QUERY
[facility]                                                                       Query Response Note      PATIENT:               REINIER ERICKSON  ACCT #:                  [id number]  MRN:                     [id number]  :                      1970  ADMIT DATE:       2020 7:39 PM  DISCH DATE:        9/15/2020 4:15 PM  RESPONDING  PROVIDER #:        [id number]           QUERY TEXT:    Pt admitted with Left lower leg cellulitis and 2/4 SIRS criteria (Pulse >90, WBC >12.0) is documented in the Medical Record. Please clarify whether:    NOTE:  If an appropriate response is not listed below, please respond with a new note.    The patient's Clinical Indicators include:  Admit VS: Pulse 106, Temp 98.0,   Admit Lab results: WBC 20.0  H&P: Left leg cellulitis  DC Summary: L leg cellulitis and leukocytosis  Treatment: Rocephin, Knee XR, Monitor labs, LLE US, Vancomycin, CT knee  Risk factors: LLE cellulitis, Homeless, HTN, Bipolar  Options provided:   -- Sepsis present on admission   -- Sepsis has been ruled out   -- SIRS that is unrelated to any infection   -- SIRS of non-infectious origin with acute organ dysfunction   -- Unable to determine      Query created by: Walter Rahman on 2020 7:15 AM    RESPONSE TEXT:    Sepsis has been ruled out          Electronically signed by:  SCOTT GLEZ MD 2020 12:49 PM

## 2020-10-23 ENCOUNTER — PATIENT OUTREACH (OUTPATIENT)
Dept: HEALTH INFORMATION MANAGEMENT | Facility: OTHER | Age: 50
End: 2020-10-23

## 2020-10-28 NOTE — PROGRESS NOTES
"10/26/2020 Outgoing call to follow up on patient status. LV for return call.     10/28/2020 Follow up outreach attempt #2, patient's mother answered, this is her phone but Rae can use it when she visits. Rae is currently homeless. Per patient's mother, patient \"is walking around on 2 bad legs\" and is concerned that patient should be at a SNF for care of her legs. She is concerned that patient's legs will worsen and \"she will lose her legs.\" Discussed [facility] resources, encouraged establishing or following up with PCP regarding legs, and provided CCM RN contact information for Rae to call if she has additional questions/concerns. Patient's mother said she gave some mail to Rae, which hopefully included previously mailed Food is Medicine Rx and resources. Can re-send if needed.   "

## 2020-11-09 ENCOUNTER — PATIENT OUTREACH (OUTPATIENT)
Dept: HEALTH INFORMATION MANAGEMENT | Facility: OTHER | Age: 50
End: 2020-11-09

## 2020-11-24 ENCOUNTER — PATIENT OUTREACH (OUTPATIENT)
Dept: HEALTH INFORMATION MANAGEMENT | Facility: OTHER | Age: 50
End: 2020-11-24

## 2020-11-24 NOTE — PROGRESS NOTES
JAMES Gan was contacted by Luz JAVIER requesting [facility]W to contact patient to provide CCM services as patient is homeless . CHW contacted patient via mobile and was unable to leave a voicemail.  CHW contacted Luz JAVIER and she will text all information for [facility] [address] [place] [id number] [phone]. Patient will need to arrive by 3 pm to fill out application and provide contact information. [facility] clears out beds at 9 am and at 3 pm.   CHW will reach out to patient on 11/25 to follow up .     12/1:  JAMES Gan called patient via mobile phone. No answer and no voicemail set up. Unable to leave a voicemail.     12/04/20 10:40 am  JAMES Gan called patient via mobile phone. No answer and no voicemail set up. Unable to leave a voicemail.     12/08/20 10:09 AM  JAMES Gan called patient via mobile phone. No answer and no voicemail setup. CCM will not follow this patient at this time. Unable to contact.

## 2020-12-04 ENCOUNTER — PATIENT OUTREACH (OUTPATIENT)
Dept: HEALTH INFORMATION MANAGEMENT | Facility: OTHER | Age: 50
End: 2020-12-04

## 2020-12-04 NOTE — PROGRESS NOTES
12/04/20 10:40 am  FELICITYW Elvia called patient via mobile phone. No answer and no voicemail set up. Unable to leave a voicemail.

## 2020-12-08 ENCOUNTER — PATIENT OUTREACH (OUTPATIENT)
Dept: HEALTH INFORMATION MANAGEMENT | Facility: OTHER | Age: 50
End: 2020-12-08

## 2022-07-20 PROCEDURE — 99284 EMERGENCY DEPT VISIT MOD MDM: CPT

## 2022-07-20 ASSESSMENT — FIBROSIS 4 INDEX: FIB4 SCORE: 2.19

## 2022-07-21 ENCOUNTER — APPOINTMENT (OUTPATIENT)
Dept: RADIOLOGY | Facility: MEDICAL CENTER | Age: 52
End: 2022-07-21
Attending: EMERGENCY MEDICINE
Payer: MEDICAID

## 2022-07-21 ENCOUNTER — HOSPITAL ENCOUNTER (EMERGENCY)
Facility: MEDICAL CENTER | Age: 52
End: 2022-07-21
Attending: EMERGENCY MEDICINE
Payer: MEDICAID

## 2022-07-21 VITALS
RESPIRATION RATE: 18 BRPM | HEIGHT: 67 IN | WEIGHT: 230 LBS | OXYGEN SATURATION: 96 % | HEART RATE: 100 BPM | SYSTOLIC BLOOD PRESSURE: 129 MMHG | DIASTOLIC BLOOD PRESSURE: 97 MMHG | BODY MASS INDEX: 36.1 KG/M2 | TEMPERATURE: 97.9 F

## 2022-07-21 DIAGNOSIS — S99.192A CLOSED FRACTURE OF BASE OF FIFTH METATARSAL BONE OF LEFT FOOT AT METAPHYSEAL-DIAPHYSEAL JUNCTION, INITIAL ENCOUNTER: ICD-10-CM

## 2022-07-21 PROCEDURE — 29515 APPLICATION SHORT LEG SPLINT: CPT

## 2022-07-21 PROCEDURE — 73630 X-RAY EXAM OF FOOT: CPT | Mod: LT

## 2022-07-21 PROCEDURE — 302874 HCHG BANDAGE ACE 2 OR 3""

## 2022-07-21 PROCEDURE — 73610 X-RAY EXAM OF ANKLE: CPT | Mod: LT

## 2022-07-21 PROCEDURE — 302875 HCHG BANDAGE ACE 4 OR 6""

## 2022-07-21 RX ORDER — IBUPROFEN 800 MG/1
800 TABLET ORAL EVERY 8 HOURS PRN
Qty: 20 TABLET | Refills: 0 | Status: SHIPPED | OUTPATIENT
Start: 2022-07-21 | End: 2022-07-24

## 2022-07-21 RX ORDER — ACETAMINOPHEN 500 MG
1000 TABLET ORAL EVERY 6 HOURS PRN
Qty: 20 TABLET | Refills: 0 | Status: SHIPPED | OUTPATIENT
Start: 2022-07-21 | End: 2022-07-25

## 2022-07-21 NOTE — ED TRIAGE NOTES
"Rae MUNIR Latif  51 y.o. female  Chief Complaint   Patient presents with   • Foot Pain     Left foot.   Pt was at the [employer] and stepped out of a truck and rolled her foot, states she heard a \"pop\". Pt states that she can't bear weight on her foot. 8/10 pain. Pt received 600mg Ibuprofen and 500mg tylenol with EMS      Pt BIB EMS. CMS intact.     Vitals:    07/20/22 2358   BP: (!) 154/103   Pulse: 88   Resp: (!) 22   Temp: 36.1 °C (97 °F)   SpO2: 98%       Triage process explained to patient, apologized for wait time, and returned to lobby.  Pt informed to notify staff of any change in condition.     "

## 2022-07-21 NOTE — ED PROVIDER NOTES
"ED Provider Note    Scribed for Carlos Enrique Beckett by Mita Molina. 7/21/2022  1:12 AM    Primary care provider: Roosevelt Graham M.D.  Means of arrival: EMS  History obtained from: Patient  History limited by: None    CHIEF COMPLAINT  Chief Complaint   Patient presents with   • Foot Pain     Left foot.   Pt was at the [facility] and stepped out of a truck and rolled her foot, states she heard a \"pop\". Pt states that she can't bear weight on her foot. 8/10 pain. Pt received 600mg Ibuprofen and 500mg tylenol with EMS        HPI  Rae Latif is a 51 y.o. female who presents to the Emergency Department via EMS to bedside for evaluation of left foot pain onset 830 PM. Patient was stepping out of a truck and rolled her foot. She heard a \"pop\" and then became unable to bear weight on that foot. She has now developed swelling to the foot and ankle as well. En route she was given ice and administered Tylenol and Ibuprofen for pain control. She has broken her left tibia and injured the left ankle in the past. She has prior medical history of COPD and Hypertension.   Quality: Bony pain  Duration: 5 hours  Severity: moderate  Associated sx: left foot and ankle swelling    REVIEW OF SYSTEMS  As above, all other systems reviewed and are negative.   See HPI for further details.     PAST MEDICAL HISTORY   has a past medical history of Arthritis shoulder (11/15/2011), ASTHMA, Bipolar disorder (HCC) (9/28/2011), COPD, mild (HCC) (1/26/2012), Head ache (9/28/2011), Hepatitis C (2005), Hypertension, Psychiatric disorder, and Rotator cuff tendinitis (9/28/2011).  SURGICAL HISTORY   has a past surgical history that includes other orthopedic surgery (2000); tubal coagulation laparoscopic bilateral; and irrigation & debridement ortho (Right, 10/22/2015).  SOCIAL HISTORY  Social History     Tobacco Use   • Smoking status: Current Some Day Smoker     Packs/day: 0.50     Years: 10.00     Pack years: 5.00     Types: Cigarettes   • " "Smokeless tobacco: Never Used   • Tobacco comment: 3 cigarettes per day   Substance Use Topics   • Alcohol use: No     Alcohol/week: 3.0 oz     Types: 6 Cans of beer per week     Comment: Past use of 6 cans of beer qd   • Drug use: Yes     Comment: Marijuana, hx of meth      Social History     Substance and Sexual Activity   Drug Use Yes    Comment: Marijuana, hx of meth     FAMILY HISTORY  Family History   Problem Relation Age of Onset   • Diabetes Maternal Grandmother    • Cancer Maternal Grandmother         breast ca, cervical ca   • Heart Disease Maternal Grandmother    • Diabetes Mother    • Cancer Mother         cervical ca   • Psychiatric Illness Mother         bipolar d/o     CURRENT MEDICATIONS  Current Outpatient Medications   Medication Instructions   • acetaminophen (TYLENOL) 650 mg, Oral, EVERY 4 HOURS PRN   • amLODIPine (NORVASC) 10 mg, Oral, DAILY   • ARIPiprazole (ABILIFY) 2 mg, Oral, EVERY EVENING   • cefdinir (OMNICEF) 300 mg, Oral, 2 TIMES DAILY     ALLERGIES  No Known Allergies    PHYSICAL EXAM    VITAL SIGNS:   Vitals:    07/20/22 2358 07/20/22 2359   BP: (!) 154/103    Pulse: 88    Resp: (!) 22    Temp: 36.1 °C (97 °F)    TempSrc: Temporal    SpO2: 98%    Weight:  104 kg (230 lb)   Height:  1.702 m (5' 7\")       Vitals: My interpretation: slightly hypertensive, not tachycardic, afebrile, not hypoxic    Reinterpretation of vitals: Unchanged    PE:   Constitutional: Well developed, Well nourished, No acute distress, Non-toxic appearance.   HENT: Normocephalic, Atraumatic, Bilateral external ears normal, Oropharynx is clear mucous membranes are moist. No oral exudates or nasal discharge.   Eyes: Pupils are equal round and reactive, EOMI, Conjunctiva normal, No discharge.   Neck: Normal range of motion, No tenderness, Supple, No stridor. No meningismus.  Lymphatic: No lymphadenopathy noted.   Cardiovascular: slightly hypertensive. Regular rate and rhythm without murmur rub or gallop.  Thorax & " "Lungs: Clear breath sounds bilaterally without wheezes, rhonchi or rales. There is no chest wall tenderness.   Abdomen: Soft non-tender non-distended. There is no rebound or guarding. No organomegaly is appreciated. Bowel sounds are normal.  Skin: Normal without rash.   Back: No CVA or spinal tenderness.   Extremities: There is tenderness along the fifth metatarsal of the left foot with obvious swelling, no significant deformity, neurovascular intact, normal capillary refill. Intact distal pulses, No edema, No tenderness, No cyanosis, No clubbing. Capillary refill is less than 2 seconds.  Musculoskeletal: Good range of motion in all major joints. No tenderness to palpation or major deformities noted.   Neurologic: Alert & oriented x 3, Normal motor function, Normal sensory function, No focal deficits noted. Reflexes are normal.  Psychiatric: Affect normal, Judgment normal, Mood normal. There is no suicidal ideation or patient reported hallucinations.     DIAGNOSTIC STUDIES / PROCEDURES  Splint Application and Check        Authorized by: Carlos Enrique Beckett       Consent: Verbal consent obtained.      Risks and benefits: risks, benefits and alternatives were discussed      Consent given by: patient      Patient understanding: patient states understanding of the procedure being performed      Patient consent: the patient's understanding of the procedure matches consent given      Patient identity confirmed: verbally with patient and arm band      Time out: Immediately prior to procedure a \"time out\" was called to verify the correct patient, procedure, equipment, support staff and site/side marked as required.      Location details: left foot      Post-procedure: The splinted body part was neurovascularly unchanged following the procedure.      Patient tolerance: Patient tolerated the procedure well with no immediate complications.    RADIOLOGY  DX-FOOT-COMPLETE 3+ LEFT   Final Result         1.  Banuelos fracture at the " base of the fifth metatarsal      DX-ANKLE 3+ VIEWS LEFT   Final Result         1.  Banuelos fracture through the base of the fifth metatarsal.           The radiologist's interpretation of all radiological studies have been reviewed by me.    COURSE & MEDICAL DECISION MAKING  Nursing notes, VS, PMSFHx, labs, imaging, EKG reviewed in chart.    MDM: 1:12 AM Rae Latif is a 51 y.o. female who presented with left lateral foot pain after stepping out of a truck and inverting her foot.  Ankle is unremarkable.  Tenderness along the fifth metatarsal.  X-ray shows Banuelos fracture.  X-ray of the ankle is unremarkable.  Patient treated with pain medications in the ED, ice.  Splint applied with posterior and stirrup.  Patient received crutches.  She will follow-up with the orthopedic surgical team for outpatient evaluation.  No other injuries noted.  Vital signs unremarkable.  Patient verbalized understanding strict return precautions outpatient follow-up plan.  Recommend ice, Tylenol, Motrin and elevation.    Patient has had high blood pressure while in the emergency department, felt likely secondary to medical condition. Counseled patient to monitor blood pressure at home and follow up with primary care physician.      The patient will return for new or worsening symptoms and is stable at the time of discharge.    The patient is referred to a primary physician for blood pressure management, diabetic screening, and for all other preventative health concerns.    DISPOSITION:  Patient will be discharged home in stable condition.    FOLLOW UP:  Lio Galindo M.D.  [address] [place] [address]  [phone]    Schedule an appointment as soon as possible for a visit in 1 week        OUTPATIENT MEDICATIONS:  New Prescriptions    ACETAMINOPHEN (TYLENOL) 500 MG TAB    Take 2 Tablets by mouth every 6 hours as needed for Moderate Pain for up to 4 days.    IBUPROFEN (MOTRIN) 800 MG TAB    Take 1 Tablet by mouth every 8 hours  as needed for Mild Pain for up to 3 days.      FINAL IMPRESSION  1. Closed fracture of base of fifth metatarsal bone of left foot at metaphyseal-diaphyseal junction, initial encounter Acute      Splint procedure performed by ERP.     Mita ROSENTHAL (Nav), am scribing for, and in the presence of, Carlos Enrique Beckett.    Electronically signed by: Mita Molina (Nav), 7/21/2022    Carlos Enrique ROSENTHAL personally performed the services described in this documentation, as scribed by Mita Molina in my presence, and it is both accurate and complete.    The note accurately reflects work and decisions made by me.  Carlos Enrique Beckett  7/21/2022  2:44 AM

## 2022-07-21 NOTE — ED NOTES
PT given AVS documentation. PT verbally acknowledges understanding of discharge instructions and ED copy is signed. VSS. All belongings accounted for by PT. PT moved to wheelchair and brought to front of ED.

## 2022-07-21 NOTE — DISCHARGE INSTRUCTIONS
You need to stay off your left foot, do not put any weight on this.  Use crutches.  Keep the splint in place.  Keep it elevated which will help with swelling and pain.  You can take a gauze milligrams of Tylenol 3 times a day and 800 mg of Motrin 3 times a day to help with pain.  I would should call the orthopedics office whose number I gave you above and make appointment be seen in about a week.  Sometimes these types of fractures do require surgery and sometimes they are able to heal on their own.  If you have any concerns or worsening symptoms, please return to the ED.  Thank you for coming in today.

## 2022-10-22 ENCOUNTER — APPOINTMENT (OUTPATIENT)
Dept: RADIOLOGY | Facility: MEDICAL CENTER | Age: 52
End: 2022-10-22
Attending: EMERGENCY MEDICINE
Payer: MEDICAID

## 2022-10-22 ENCOUNTER — APPOINTMENT (OUTPATIENT)
Dept: RADIOLOGY | Facility: MEDICAL CENTER | Age: 52
End: 2022-10-22
Payer: MEDICAID

## 2022-10-22 ENCOUNTER — HOSPITAL ENCOUNTER (EMERGENCY)
Facility: MEDICAL CENTER | Age: 52
End: 2022-10-22
Attending: EMERGENCY MEDICINE
Payer: MEDICAID

## 2022-10-22 VITALS
HEART RATE: 92 BPM | SYSTOLIC BLOOD PRESSURE: 144 MMHG | RESPIRATION RATE: 20 BRPM | OXYGEN SATURATION: 97 % | WEIGHT: 230.6 LBS | TEMPERATURE: 98 F | DIASTOLIC BLOOD PRESSURE: 90 MMHG | BODY MASS INDEX: 36.12 KG/M2

## 2022-10-22 DIAGNOSIS — R07.89 CHEST PRESSURE: ICD-10-CM

## 2022-10-22 DIAGNOSIS — Z76.0 MEDICATION REFILL: ICD-10-CM

## 2022-10-22 DIAGNOSIS — I10 PRIMARY HYPERTENSION: ICD-10-CM

## 2022-10-22 DIAGNOSIS — J98.01 BRONCHOSPASM: ICD-10-CM

## 2022-10-22 DIAGNOSIS — N89.8 VAGINAL DISCHARGE: ICD-10-CM

## 2022-10-22 LAB
ALBUMIN SERPL BCP-MCNC: 3.2 G/DL (ref 3.2–4.9)
ALBUMIN/GLOB SERPL: 0.7 G/DL
ALP SERPL-CCNC: 106 U/L (ref 30–99)
ALT SERPL-CCNC: 31 U/L (ref 2–50)
ANION GAP SERPL CALC-SCNC: 10 MMOL/L (ref 7–16)
AST SERPL-CCNC: 34 U/L (ref 12–45)
BASOPHILS # BLD AUTO: 0.6 % (ref 0–1.8)
BASOPHILS # BLD: 0.03 K/UL (ref 0–0.12)
BILIRUB SERPL-MCNC: 0.3 MG/DL (ref 0.1–1.5)
BUN SERPL-MCNC: 12 MG/DL (ref 8–22)
CALCIUM SERPL-MCNC: 9.1 MG/DL (ref 8.5–10.5)
CHLORIDE SERPL-SCNC: 103 MMOL/L (ref 96–112)
CO2 SERPL-SCNC: 23 MMOL/L (ref 20–33)
CREAT SERPL-MCNC: 0.83 MG/DL (ref 0.5–1.4)
EKG IMPRESSION: NORMAL
EOSINOPHIL # BLD AUTO: 0.15 K/UL (ref 0–0.51)
EOSINOPHIL NFR BLD: 3 % (ref 0–6.9)
ERYTHROCYTE [DISTWIDTH] IN BLOOD BY AUTOMATED COUNT: 44.7 FL (ref 35.9–50)
GFR SERPLBLD CREATININE-BSD FMLA CKD-EPI: 84 ML/MIN/1.73 M 2
GLOBULIN SER CALC-MCNC: 4.3 G/DL (ref 1.9–3.5)
GLUCOSE SERPL-MCNC: 131 MG/DL (ref 65–99)
HCG SERPL QL: NEGATIVE
HCT VFR BLD AUTO: 45.7 % (ref 37–47)
HGB BLD-MCNC: 15.2 G/DL (ref 12–16)
HIV 1+2 AB+HIV1 P24 AG SERPL QL IA: NORMAL
IMM GRANULOCYTES # BLD AUTO: 0.03 K/UL (ref 0–0.11)
IMM GRANULOCYTES NFR BLD AUTO: 0.6 % (ref 0–0.9)
LYMPHOCYTES # BLD AUTO: 2.01 K/UL (ref 1–4.8)
LYMPHOCYTES NFR BLD: 39.8 % (ref 22–41)
MCH RBC QN AUTO: 31 PG (ref 27–33)
MCHC RBC AUTO-ENTMCNC: 33.3 G/DL (ref 33.6–35)
MCV RBC AUTO: 93.3 FL (ref 81.4–97.8)
MONOCYTES # BLD AUTO: 0.63 K/UL (ref 0–0.85)
MONOCYTES NFR BLD AUTO: 12.5 % (ref 0–13.4)
NEUTROPHILS # BLD AUTO: 2.2 K/UL (ref 2–7.15)
NEUTROPHILS NFR BLD: 43.5 % (ref 44–72)
NRBC # BLD AUTO: 0 K/UL
NRBC BLD-RTO: 0 /100 WBC
PLATELET # BLD AUTO: 203 K/UL (ref 164–446)
PMV BLD AUTO: 9.1 FL (ref 9–12.9)
POTASSIUM SERPL-SCNC: 4 MMOL/L (ref 3.6–5.5)
PROT SERPL-MCNC: 7.5 G/DL (ref 6–8.2)
RBC # BLD AUTO: 4.9 M/UL (ref 4.2–5.4)
SODIUM SERPL-SCNC: 136 MMOL/L (ref 135–145)
T PALLIDUM AB SER QL IA: NORMAL
TROPONIN T SERPL-MCNC: 8 NG/L (ref 6–19)
WBC # BLD AUTO: 5.1 K/UL (ref 4.8–10.8)

## 2022-10-22 PROCEDURE — 87491 CHLMYD TRACH DNA AMP PROBE: CPT

## 2022-10-22 PROCEDURE — 700102 HCHG RX REV CODE 250 W/ 637 OVERRIDE(OP): Performed by: EMERGENCY MEDICINE

## 2022-10-22 PROCEDURE — 93005 ELECTROCARDIOGRAM TRACING: CPT

## 2022-10-22 PROCEDURE — 87591 N.GONORRHOEAE DNA AMP PROB: CPT

## 2022-10-22 PROCEDURE — 80053 COMPREHEN METABOLIC PANEL: CPT

## 2022-10-22 PROCEDURE — 700101 HCHG RX REV CODE 250: Performed by: EMERGENCY MEDICINE

## 2022-10-22 PROCEDURE — 87389 HIV-1 AG W/HIV-1&-2 AB AG IA: CPT

## 2022-10-22 PROCEDURE — 96372 THER/PROPH/DIAG INJ SC/IM: CPT

## 2022-10-22 PROCEDURE — 86780 TREPONEMA PALLIDUM: CPT

## 2022-10-22 PROCEDURE — A9270 NON-COVERED ITEM OR SERVICE: HCPCS | Performed by: EMERGENCY MEDICINE

## 2022-10-22 PROCEDURE — 36415 COLL VENOUS BLD VENIPUNCTURE: CPT

## 2022-10-22 PROCEDURE — 700111 HCHG RX REV CODE 636 W/ 250 OVERRIDE (IP): Performed by: EMERGENCY MEDICINE

## 2022-10-22 PROCEDURE — 93005 ELECTROCARDIOGRAM TRACING: CPT | Performed by: EMERGENCY MEDICINE

## 2022-10-22 PROCEDURE — 84484 ASSAY OF TROPONIN QUANT: CPT

## 2022-10-22 PROCEDURE — 84703 CHORIONIC GONADOTROPIN ASSAY: CPT

## 2022-10-22 PROCEDURE — 71045 X-RAY EXAM CHEST 1 VIEW: CPT

## 2022-10-22 PROCEDURE — 99284 EMERGENCY DEPT VISIT MOD MDM: CPT

## 2022-10-22 PROCEDURE — 85025 COMPLETE CBC W/AUTO DIFF WBC: CPT

## 2022-10-22 RX ORDER — CEFTRIAXONE 500 MG/1
500 INJECTION, POWDER, FOR SOLUTION INTRAMUSCULAR; INTRAVENOUS ONCE
Status: COMPLETED | OUTPATIENT
Start: 2022-10-22 | End: 2022-10-22

## 2022-10-22 RX ORDER — AZITHROMYCIN 250 MG/1
1000 TABLET, FILM COATED ORAL ONCE
Status: COMPLETED | OUTPATIENT
Start: 2022-10-22 | End: 2022-10-22

## 2022-10-22 RX ORDER — PREDNISONE 20 MG/1
60 TABLET ORAL ONCE
Status: COMPLETED | OUTPATIENT
Start: 2022-10-22 | End: 2022-10-22

## 2022-10-22 RX ORDER — PREDNISONE 20 MG/1
60 TABLET ORAL DAILY
Qty: 12 TABLET | Refills: 0 | Status: SHIPPED | OUTPATIENT
Start: 2022-10-22 | End: 2022-10-26

## 2022-10-22 RX ORDER — AMLODIPINE BESYLATE 10 MG/1
10 TABLET ORAL DAILY
Qty: 30 TABLET | Refills: 0 | Status: SHIPPED | OUTPATIENT
Start: 2022-10-22

## 2022-10-22 RX ORDER — ALBUTEROL SULFATE 90 UG/1
2 AEROSOL, METERED RESPIRATORY (INHALATION) EVERY 6 HOURS PRN
Qty: 1 EACH | Refills: 0 | Status: SHIPPED | OUTPATIENT
Start: 2022-10-22

## 2022-10-22 RX ORDER — LISINOPRIL 10 MG/1
10 TABLET ORAL ONCE
Status: COMPLETED | OUTPATIENT
Start: 2022-10-22 | End: 2022-10-22

## 2022-10-22 RX ORDER — ALBUTEROL SULFATE 90 UG/1
2 AEROSOL, METERED RESPIRATORY (INHALATION) ONCE
Status: COMPLETED | OUTPATIENT
Start: 2022-10-22 | End: 2022-10-22

## 2022-10-22 RX ADMIN — ALBUTEROL SULFATE 2 PUFF: 90 AEROSOL, METERED RESPIRATORY (INHALATION) at 18:15

## 2022-10-22 RX ADMIN — LIDOCAINE HYDROCHLORIDE 1 ML: 10 INJECTION, SOLUTION INFILTRATION; PERINEURAL at 19:11

## 2022-10-22 RX ADMIN — LISINOPRIL 10 MG: 10 TABLET ORAL at 18:15

## 2022-10-22 RX ADMIN — CEFTRIAXONE SODIUM 500 MG: 500 INJECTION, POWDER, FOR SOLUTION INTRAMUSCULAR; INTRAVENOUS at 19:11

## 2022-10-22 RX ADMIN — AZITHROMYCIN MONOHYDRATE 1000 MG: 250 TABLET ORAL at 19:11

## 2022-10-22 RX ADMIN — PREDNISONE 60 MG: 20 TABLET ORAL at 18:15

## 2022-10-22 ASSESSMENT — LIFESTYLE VARIABLES: DO YOU DRINK ALCOHOL: NO

## 2022-10-22 NOTE — ED TRIAGE NOTES
Patient comes in with complaints of sob and cp, multiple complaints, she needs a refill on her bp medication lisinopril and possible std exposure.  Symptoms have been going on for 3-4 days.  Patient does smoke.  Positive nausea.

## 2022-10-23 LAB
C TRACH DNA SPEC QL NAA+PROBE: NEGATIVE
N GONORRHOEA DNA SPEC QL NAA+PROBE: NEGATIVE
SPECIMEN SOURCE: NORMAL

## 2022-10-23 NOTE — DISCHARGE INSTRUCTIONS
Follow-up with your primary doctor for recheck and any further medication refills you may require.  Please return if worse or for any concerns.  As we discussed, you have been treated today for potential sexually transmitted disease, in case your testing returns positive in 2 days.  If further medication is needed, you will be contacted by the hospital at the phone number you provided.

## 2022-10-23 NOTE — ED PROVIDER NOTES
"ED Provider Note    CHIEF COMPLAINT  Chief Complaint   Patient presents with    Shortness of Breath    Chest Pain       HPI  Rae Latif is a 52 y.o. female who presents with multiple complaints.  First complaint is that she has run out of her lisinopril (she later stated it was actually amlodipine, having switched after developing a cough on lisinopril) and requesting a refill prescription.  Second complaint is her boyfriend told her she needed to get tested for STDs.  She states she has had a mild vaginal discharge, greenish in nature.  No dysuria, no lesions.  No vaginal pain third complaint anterior chest pressure, she states she ran out of her inhaler 3 days ago.  The pressure has been constant for 3 days, feels similar to her COPD when it worsens.  She states in the past has used inhalers and prednisone for this.  No pleurisy.  Cough with exertion, no fever.  Cough nonproductive.  No exertional chest pain.    REVIEW OF SYSTEMS    Constitutional: No fever  Respiratory: Cough  Cardiac: Chest pressure, no syncope  Gastrointestinal: No abdominal pain, no vomiting  Musculoskeletal: No flank pain  Neurologic: Denies headache  Genitourinary: No dysuria.  Mild vaginal discharge, recently told by \"ex\" she needed to get tested for STD       All other systems are negative.       PAST MEDICAL HISTORY  Past Medical History:   Diagnosis Date    Arthritis shoulder 11/15/2011    ASTHMA     Bipolar disorder (HCC) 9/28/2011    COPD, mild (HCC) 1/26/2012    Head ache 9/28/2011    Hepatitis C 2005    not treated, states completed [employer] while in [place]    Hypertension     Psychiatric disorder     depression, bipolar d/o, anxiety    Rotator cuff tendinitis 9/28/2011       FAMILY HISTORY  Family History   Problem Relation Age of Onset    Diabetes Maternal Grandmother     Cancer Maternal Grandmother         breast ca, cervical ca    Heart Disease Maternal Grandmother     Diabetes Mother     Cancer Mother         cervical ca    " Psychiatric Illness Mother         bipolar d/o       SOCIAL HISTORY  Social History     Socioeconomic History    Marital status: Single   Tobacco Use    Smoking status: Some Days     Packs/day: 0.50     Years: 10.00     Pack years: 5.00     Types: Cigarettes    Smokeless tobacco: Never    Tobacco comments:     3 cigarettes per day   Substance and Sexual Activity    Alcohol use: No     Alcohol/week: 3.0 oz     Types: 6 Cans of beer per week     Comment: Past use of 6 cans of beer qd    Drug use: Yes     Comment: Marijuana, hx of meth       SURGICAL HISTORY  Past Surgical History:   Procedure Laterality Date    IRRIGATION & DEBRIDEMENT ORTHO Right 10/22/2015    Procedure: IRRIGATION & DEBRIDEMENT SHOULDER;  Surgeon: Kam Ramirez M.D.;  Location: SURGERY [facility];  Service:     OTHER ORTHOPEDIC SURGERY  2000    right knee    TUBAL COAGULATION LAPAROSCOPIC BILATERAL         CURRENT MEDICATIONS  Home Medications       Reviewed by Marry Bhardwaj R.N. (Registered Nurse) on 10/22/22 at 1615  Med List Status: <None>     Medication Last Dose Status   acetaminophen (TYLENOL) 325 MG Tab  Active   amLODIPine (NORVASC) 10 MG Tab  Active   ARIPiprazole (ABILIFY) 2 MG tablet  Active   cefdinir (OMNICEF) 300 MG Cap  Active                    ALLERGIES  No Known Allergies    PHYSICAL EXAM  VITAL SIGNS: BP (!) 137/97   Pulse 94   Temp 36.8 °C (98.2 °F) (Temporal)   Resp 18   Wt 105 kg (230 lb 9.6 oz)   SpO2 98%   BMI 36.12 kg/m²   Constitutional:  Non-toxic appearance.   HENT: No facial swelling  Eyes: Anicteric, no conjunctivitis.     Cardiovascular: Normal heart rate, Normal rhythm  Pulmonary: Faint diffuse expiratory wheezing, No rales.   Gastrointestinal: Soft, No tenderness, No distention or palpable mass  Genitourinary: Exam deferred at request patient  Skin: Warm, Dry, No cyanosis.   Neurologic: Speech is clear, follows commands, facial expression is symmetrical.  Psychiatric: Affect normal,  Mood normal.   Patient is calm and cooperative  Musculoskeletal: No flank tenderness    EKG/Labs  Results for orders placed or performed during the hospital encounter of 10/22/22   CBC with Differential   Result Value Ref Range    WBC 5.1 4.8 - 10.8 K/uL    RBC 4.90 4.20 - 5.40 M/uL    Hemoglobin 15.2 12.0 - 16.0 g/dL    Hematocrit 45.7 37.0 - 47.0 %    MCV 93.3 81.4 - 97.8 fL    MCH 31.0 27.0 - 33.0 pg    MCHC 33.3 (L) 33.6 - 35.0 g/dL    RDW 44.7 35.9 - 50.0 fL    Platelet Count 203 164 - 446 K/uL    MPV 9.1 9.0 - 12.9 fL    Neutrophils-Polys 43.50 (L) 44.00 - 72.00 %    Lymphocytes 39.80 22.00 - 41.00 %    Monocytes 12.50 0.00 - 13.40 %    Eosinophils 3.00 0.00 - 6.90 %    Basophils 0.60 0.00 - 1.80 %    Immature Granulocytes 0.60 0.00 - 0.90 %    Nucleated RBC 0.00 /100 WBC    Neutrophils (Absolute) 2.20 2.00 - 7.15 K/uL    Lymphs (Absolute) 2.01 1.00 - 4.80 K/uL    Monos (Absolute) 0.63 0.00 - 0.85 K/uL    Eos (Absolute) 0.15 0.00 - 0.51 K/uL    Baso (Absolute) 0.03 0.00 - 0.12 K/uL    Immature Granulocytes (abs) 0.03 0.00 - 0.11 K/uL    NRBC (Absolute) 0.00 K/uL   Complete Metabolic Panel (CMP)   Result Value Ref Range    Sodium 136 135 - 145 mmol/L    Potassium 4.0 3.6 - 5.5 mmol/L    Chloride 103 96 - 112 mmol/L    Co2 23 20 - 33 mmol/L    Anion Gap 10.0 7.0 - 16.0    Glucose 131 (H) 65 - 99 mg/dL    Bun 12 8 - 22 mg/dL    Creatinine 0.83 0.50 - 1.40 mg/dL    Calcium 9.1 8.5 - 10.5 mg/dL    AST(SGOT) 34 12 - 45 U/L    ALT(SGPT) 31 2 - 50 U/L    Alkaline Phosphatase 106 (H) 30 - 99 U/L    Total Bilirubin 0.3 0.1 - 1.5 mg/dL    Albumin 3.2 3.2 - 4.9 g/dL    Total Protein 7.5 6.0 - 8.2 g/dL    Globulin 4.3 (H) 1.9 - 3.5 g/dL    A-G Ratio 0.7 g/dL   Troponins NOW   Result Value Ref Range    Troponin T 8 6 - 19 ng/L   HCG Qual Serum   Result Value Ref Range    Beta-Hcg Qualitative Serum Negative Negative   ESTIMATED GFR   Result Value Ref Range    GFR (CKD-EPI) 84 >60 mL/min/1.73 m 2   Chlamydia/GC, PCR (Urine)    Specimen:  Urine   Result Value Ref Range    Source Urine    T.PALLIDUM AB MYRA (SCREENING)   Result Value Ref Range    Syphilis, Treponemal Qual Non-Reactive Non-Reactive   HIV AG/AB COMBO ASSAY SCREENING   Result Value Ref Range    HIV Ag/Ab Combo Assay Non-Reactive Non Reactive   EKG   Result Value Ref Range    Report       [facility] Emergency Dept.    Test Date:  2022-10-22  Pt Name:    REINIER ERICKSON                  Department: ER  MRN:        [id number]                      Room:  Gender:     Female                       Technician: [id number]  :        [id number]                   Requested By:ER TRIAGE PROTOCOL  Order #:    [id number]                    Reading MD: SUSHMA SAMUELS MD    Measurements  Intervals                                Axis  Rate:       80                           P:          62  WA:         140                          QRS:        16  QRSD:       87                           T:          52  QT:         371  QTc:        428    Interpretive Statements  Sinus rhythm  Probable left atrial enlargement  Borderline T abnormalities, lateral leads  Compared to ECG 10/10/2015 14:13:58  unchanged from previous  Electronically Signed On 10- 18:44:26 PDT by SUSHMA SAMUELS MD           RADIOLOGY/PROCEDURES  DX-CHEST-PORTABLE (1 VIEW)   Final Result         No acute cardiac or pulmonary abnormality is identified.            COURSE & MEDICAL DECISION MAKING  Pertinent Labs & Imaging studies reviewed. (See chart for details)  Patient with chest pressure, likely secondary to elevation of blood pressure, off her medications for the last week.  She was treated with lisinopril as this is what she initially requested, however her medication amlodipine has been refilled, stating she would prefer to be on amlodipine after thinking about it.  Unable to rule out trichomonas without pelvic exam.  Her blood work is negative for syphilis and HIV.  Gonorrhea and committee a testing were obtained  through urine, these are pending.  She was given presumptive treatment with 500 mg intramuscular Rocephin and 1 g oral Zithromax.  Troponin is negative.  No ischemic change on EKG.  After 3 days of constant pressure, negative troponin rules out MI.  There was mild wheezing on exam and the patient was treated with albuterol, prednisone, will continue on 4 days more prednisone.  She is advised see doctor for recheck as soon as possible, to return if worse or for any concerns.    FINAL IMPRESSION     1. Chest pressure        2. Bronchospasm  predniSONE (DELTASONE) 20 MG Tab    albuterol 108 (90 Base) MCG/ACT Aero Soln inhalation aerosol      3. Medication refill        4. Vaginal discharge        5. Primary hypertension  amLODIPine (NORVASC) 10 MG Tab                      Electronically signed by: Devin Do M.D., 10/22/2022 6:19 PM

## 2022-10-23 NOTE — ED NOTES
Pt discharge home. Pt given discharge instructions and prescription. Pt verbalized understanding, all questions answered ,vss upon d/c. Pt steady on feet upon discharge
